# Patient Record
Sex: FEMALE | Race: WHITE | NOT HISPANIC OR LATINO | Employment: FULL TIME | URBAN - METROPOLITAN AREA
[De-identification: names, ages, dates, MRNs, and addresses within clinical notes are randomized per-mention and may not be internally consistent; named-entity substitution may affect disease eponyms.]

---

## 2023-08-29 ENCOUNTER — OFFICE VISIT (OUTPATIENT)
Dept: URGENT CARE | Facility: CLINIC | Age: 58
End: 2023-08-29
Payer: COMMERCIAL

## 2023-08-29 ENCOUNTER — APPOINTMENT (OUTPATIENT)
Dept: RADIOLOGY | Facility: CLINIC | Age: 58
End: 2023-08-29
Payer: COMMERCIAL

## 2023-08-29 VITALS
WEIGHT: 140 LBS | OXYGEN SATURATION: 99 % | RESPIRATION RATE: 16 BRPM | HEIGHT: 65 IN | HEART RATE: 80 BPM | TEMPERATURE: 97.6 F | BODY MASS INDEX: 23.32 KG/M2

## 2023-08-29 DIAGNOSIS — S42.291A HUMERAL HEAD FRACTURE, RIGHT, CLOSED, INITIAL ENCOUNTER: Primary | ICD-10-CM

## 2023-08-29 DIAGNOSIS — M25.511 ACUTE PAIN OF RIGHT SHOULDER: ICD-10-CM

## 2023-08-29 PROCEDURE — 99213 OFFICE O/P EST LOW 20 MIN: CPT | Performed by: PHYSICIAN ASSISTANT

## 2023-08-29 PROCEDURE — 73030 X-RAY EXAM OF SHOULDER: CPT

## 2023-08-29 NOTE — PATIENT INSTRUCTIONS
Patient was placed in a sling for right shoulder humeral head fracture. Patient was told any decrease sensation  or increased pain go to ED. Patient was told to follow up with ortho tomorrow. Shoulder Pain   WHAT YOU NEED TO KNOW:   Shoulder pain is a common problem that can affect your daily activities. Pain can be caused by a problem within your shoulder, such as soreness of a tendon or bursa. A tendon is a cord of tough tissue that connects your muscles to your bones. The bursa is a fluid-filled sac that acts as a cushion between a bone and a tendon. Shoulder pain may also be caused by pain that spreads to your shoulder from another part of your body. DISCHARGE INSTRUCTIONS:   Return to the emergency department if:   You have severe pain. You cannot move your arm or shoulder. You have numbness or tingling in your shoulder or arm. Contact your healthcare provider if:   Your pain gets worse or does not go away with treatment. You have trouble moving your arm or shoulder. You have questions or concerns about your condition or care. Medicines: You may need any of the following:  Acetaminophen  decreases pain and fever. It is available without a doctor's order. Ask how much to take and how often to take it. Follow directions. Read the labels of all other medicines you are using to see if they also contain acetaminophen, or ask your doctor or pharmacist. Acetaminophen can cause liver damage if not taken correctly. NSAIDs , such as ibuprofen, help decrease swelling, pain, and fever. This medicine is available with or without a doctor's order. NSAIDs can cause stomach bleeding or kidney problems in certain people. If you take blood thinner medicine, always ask your healthcare provider if NSAIDs are safe for you. Always read the medicine label and follow directions. Take your medicine as directed.   Contact your healthcare provider if you think your medicine is not helping or if you have side effects. Tell your provider if you are allergic to any medicine. Keep a list of the medicines, vitamins, and herbs you take. Include the amounts, and when and why you take them. Bring the list or the pill bottles to follow-up visits. Carry your medicine list with you in case of an emergency. Manage your symptoms:   Apply ice  on your shoulder for 20 to 30 minutes every 2 hours or as directed. Use an ice pack, or put crushed ice in a plastic bag. Cover it with a towel before you apply it to your shoulder. Ice helps prevent tissue damage and decreases swelling and pain. Apply heat if ice does not help your symptoms. Apply heat on your shoulder for 20 to 30 minutes every 2 hours for as many days as directed. Heat helps decrease pain and muscle spasms. Limit activities as directed. Try to avoid repeated overhead movements. Go to physical or occupational therapy as directed. A physical therapist teaches you exercises to help improve movement and strength, and to decrease pain. An occupational therapist teaches you skills to help with your daily activities. Prevent shoulder pain:   Maintain a good range of motion in your shoulder. Ask your healthcare provider which exercises you should do on a regular basis after you have healed. Stretch and strengthen your shoulder. Use proper technique during exercises and sports. Follow up with your healthcare provider or orthopedist as directed:  Write down your questions so you remember to ask them during your visits. © Copyright [employer] 2022 Information is for End User's use only and may not be sold, redistributed or otherwise used for commercial purposes. The above information is an  only. It is not intended as medical advice for individual conditions or treatments. Talk to your doctor, nurse or pharmacist before following any medical regimen to see if it is safe and effective for you.

## 2023-08-29 NOTE — PROGRESS NOTES
[facility]        NAME: Nan Miranda is a 60 y.o. female  : 1965    MRN: [id number]  DATE: 2023  TIME: 8:34 PM    Assessment and Plan   Humeral head fracture, right, closed, initial encounter [S42.291A]  1. Humeral head fracture, right, closed, initial encounter  XR shoulder 2+ vw right    Ambulatory Referral to Orthopedic Surgery    Comfort Arm Sling      Xray of right humeral head- Fracture or humeral head. Pending radiology report. Patient Instructions       Patient was placed in a sling for right shoulder humeral head fracture. Patient was told any decrease sensation  or increased pain go to ED. Patient was told to follow up with ortho tomorrow. Chief Complaint     Chief Complaint   Patient presents with   • Fall     Pt reports while walking to her car she tripped over her pants resulting in fall with right side impact against concrete. C/o limited rom with right arm. History of Present Illness       Patient is here today with  for right shoulder pain. Patient reports she tripped over pant leg and fell on right shoulder. Patient report limited right shoulder pain. Denies any allergies to medications. Review of Systems   Review of Systems   Constitutional: Negative. Respiratory: Negative. Cardiovascular: Negative. Musculoskeletal:        Right shoulder pain   Psychiatric/Behavioral: Negative. Current Medications     No current outpatient medications on file. Current Allergies     Allergies as of 2023   • (No Known Allergies)            The following portions of the patient's history were reviewed and updated as appropriate: allergies, current medications, past family history, past medical history, past social history, past surgical history and problem list.     History reviewed. No pertinent past medical history. History reviewed. No pertinent surgical history. History reviewed. No pertinent family history.       Medications have been verified. Objective   Pulse 80   Temp 97.6 °F (36.4 °C)   Resp 16   Ht 5' 5" (1.651 m)   Wt 63.5 kg (140 lb)   SpO2 99%   BMI 23.30 kg/m²   No LMP recorded. Physical Exam     Physical Exam  Vitals and nursing note reviewed. Constitutional:       Appearance: She is normal weight. Cardiovascular:      Rate and Rhythm: Normal rate and regular rhythm. Heart sounds: Normal heart sounds. Pulmonary:      Breath sounds: Normal breath sounds. Musculoskeletal:      Comments: Pain over right shoulder joint. No pain over right elbow. Pain over right proximal humerus. Limited movement in right shoulder. Sensation intact in right arm   Neurological:      General: No focal deficit present. Mental Status: She is alert and oriented to person, place, and time.    Psychiatric:         Mood and Affect: Mood normal.         Behavior: Behavior normal.

## 2023-08-30 ENCOUNTER — TELEPHONE (OUTPATIENT)
Dept: URGENT CARE | Facility: CLINIC | Age: 58
End: 2023-08-30

## 2023-08-30 ENCOUNTER — HOSPITAL ENCOUNTER (OUTPATIENT)
Dept: CT IMAGING | Facility: HOSPITAL | Age: 58
Discharge: HOME | End: 2023-08-30
Attending: ORTHOPAEDIC SURGERY
Payer: COMMERCIAL

## 2023-08-30 ENCOUNTER — OFFICE VISIT (OUTPATIENT)
Dept: OBGYN CLINIC | Facility: CLINIC | Age: 58
End: 2023-08-30
Payer: COMMERCIAL

## 2023-08-30 VITALS
DIASTOLIC BLOOD PRESSURE: 86 MMHG | SYSTOLIC BLOOD PRESSURE: 144 MMHG | BODY MASS INDEX: 23.32 KG/M2 | WEIGHT: 140 LBS | HEIGHT: 65 IN

## 2023-08-30 DIAGNOSIS — S42.201A CLOSED FRACTURE OF PROXIMAL END OF RIGHT HUMERUS, INITIAL ENCOUNTER: ICD-10-CM

## 2023-08-30 DIAGNOSIS — S42.291A OTHER CLOSED DISPLACED FRACTURE OF PROXIMAL END OF RIGHT HUMERUS, INITIAL ENCOUNTER: Primary | ICD-10-CM

## 2023-08-30 PROCEDURE — 73200 CT UPPER EXTREMITY W/O DYE: CPT

## 2023-08-30 PROCEDURE — 99203 OFFICE O/P NEW LOW 30 MIN: CPT | Performed by: ORTHOPAEDIC SURGERY

## 2023-08-30 NOTE — PROGRESS NOTES
Assessment:     1. Other closed displaced fracture of proximal end of right humerus, initial encounter        Plan:     Problem List Items Addressed This Visit        Musculoskeletal and Integument    Closed fracture of right proximal humerus - Primary     Findings consistent with right proximal humerus fracture with comminution and mild displacement. Discussed findings and treatment options with the patient. Imaging was reviewed and physical exam was perfomed today. I recommended CT to better evaluate the fracture pattern to determine whether surgical treatment is required. Sling was adjusted for proper fit. Sling can be removed to do elbow movement to avoid stiffness. Discussed it is important to keep moving fingers and wrist. Sleep in a reclining position for most comfort and least amount of pain. Patient will return following imaging. All patient's questions were answered to their satisfaction. This note is created using dictation transcription. It may contain typographical errors, grammatical errors, improperly dictated words, background noise and other errors. Relevant Orders    CT upper extremity wo contrast right       Subjective:     Patient ID: Kamla Concepcion is a 60 y.o. female. Chief Complaint:  25-year-old female injured her right shoulder after tripped on her pants and fell. Patient referred here by Yolanda Zapien PA-C. she landed on her right arm with immediate pain in her shoulder. She denies loss of consciousness. She has difficulty moving her right arm after her injury. Patient was seen in the  [facility]. She was found to have a fracture in her right shoulder and placed in a sling. She denies other injury. Information on patient's intake form was reviewed. Allergy:  No Known Allergies  Medications:  all current active meds have been reviewed  Past Medical History:  History reviewed. No pertinent past medical history.   Past Surgical History:  History reviewed. No pertinent surgical history. Family History:  History reviewed. No pertinent family history. Social History:  Social History     Substance and Sexual Activity   Alcohol Use None     Social History     Substance and Sexual Activity   Drug Use Not on file     Social History     Tobacco Use   Smoking Status Never   Smokeless Tobacco Never     Review of Systems   Constitutional: Negative for chills and fever. HENT: Negative for ear pain and sore throat. Eyes: Negative for pain and visual disturbance. Respiratory: Negative for cough and shortness of breath. Cardiovascular: Negative for chest pain and palpitations. Gastrointestinal: Negative for abdominal pain and vomiting. Genitourinary: Negative for dysuria and hematuria. Musculoskeletal: Positive for arthralgias (Right shoulder) and joint swelling (Right shoulder). Negative for back pain. Skin: Negative for color change and rash. Neurological: Negative for seizures and syncope. Psychiatric/Behavioral: Negative. All other systems reviewed and are negative. Objective:  BP Readings from Last 1 Encounters:   08/30/23 144/86      Wt Readings from Last 1 Encounters:   08/30/23 63.5 kg (140 lb)      BMI:   Estimated body mass index is 23.3 kg/m² as calculated from the following:    Height as of this encounter: 5' 5" (1.651 m). Weight as of this encounter: 63.5 kg (140 lb). BSA:   Estimated body surface area is 1.7 meters squared as calculated from the following:    Height as of this encounter: 5' 5" (1.651 m). Weight as of this encounter: 63.5 kg (140 lb). Physical Exam  Vitals and nursing note reviewed. Constitutional:       Appearance: Normal appearance. She is well-developed. HENT:      Head: Normocephalic and atraumatic. Right Ear: External ear normal.      Left Ear: External ear normal.   Eyes:      Extraocular Movements: Extraocular movements intact.       Conjunctiva/sclera: Conjunctivae normal. Pulmonary:      Effort: Pulmonary effort is normal.   Musculoskeletal:      Cervical back: Neck supple. Skin:     General: Skin is warm and dry. Neurological:      Mental Status: She is alert and oriented to person, place, and time. Deep Tendon Reflexes: Reflexes are normal and symmetric. Psychiatric:         Mood and Affect: Mood normal.         Behavior: Behavior normal.       Right Shoulder Exam     Tenderness   Right shoulder tenderness location: Diffused. Other   Erythema: absent  Sensation: normal  Pulse: present    Comments:  Range of motion and strength not tested due to known fracture            I have personally reviewed pertinent films in PACS and my interpretation is Right shoulder x-rays show proximal humerus fracture at the surgical neck extending into the greater tuberosity. There is slight impaction of the humerus head. Slight displacement. The fracture does not involve the articular surface of the humerus head.

## 2023-08-30 NOTE — ASSESSMENT & PLAN NOTE
Findings consistent with right proximal humerus fracture with comminution and mild displacement. Discussed findings and treatment options with the patient. Imaging was reviewed and physical exam was perfomed today. I recommended CT to better evaluate the fracture pattern to determine whether surgical treatment is required. Sling was adjusted for proper fit. Sling can be removed to do elbow movement to avoid stiffness. Discussed it is important to keep moving fingers and wrist. Sleep in a reclining position for most comfort and least amount of pain. Patient will return following imaging. All patient's questions were answered to their satisfaction. This note is created using dictation transcription. It may contain typographical errors, grammatical errors, improperly dictated words, background noise and other errors.

## 2023-08-31 ENCOUNTER — OFFICE VISIT (OUTPATIENT)
Dept: OBGYN CLINIC | Facility: CLINIC | Age: 58
End: 2023-08-31
Payer: COMMERCIAL

## 2023-08-31 VITALS
SYSTOLIC BLOOD PRESSURE: 144 MMHG | DIASTOLIC BLOOD PRESSURE: 84 MMHG | WEIGHT: 140 LBS | HEIGHT: 65 IN | BODY MASS INDEX: 23.32 KG/M2

## 2023-08-31 DIAGNOSIS — S42.291A OTHER CLOSED DISPLACED FRACTURE OF PROXIMAL END OF RIGHT HUMERUS, INITIAL ENCOUNTER: Primary | ICD-10-CM

## 2023-08-31 PROCEDURE — 99214 OFFICE O/P EST MOD 30 MIN: CPT | Performed by: ORTHOPAEDIC SURGERY

## 2023-08-31 PROCEDURE — 23600 CLTX PROX HUMRL FX W/O MNPJ: CPT | Performed by: ORTHOPAEDIC SURGERY

## 2023-08-31 NOTE — PROGRESS NOTES
Assessment:     1. Other closed displaced fracture of proximal end of right humerus, initial encounter        Plan:     Problem List Items Addressed This Visit        Musculoskeletal and Integument    Closed fracture of right proximal humerus - Primary     Findings consistent with right proximal humerus comminuted fracture with mild displacement. Discussed findings and treatment options with the patient. I reviewed patient's right shoulder CT scan with her and her . I explained to them that the fracture pattern appear to be well aligned up with slight displacement of the greater tuberosity by about 5 mm. The alignment of the fracture appear to be in acceptable position. We should be able to treat her fracture without surgical interventions as long as the fracture does not displace further. I have instructed patient how to position her arm and using the sling. I also advised her to avoid activities that could potentially displace the fracture further. I will see patient back in 3 weeks with a repeat x-ray of the shoulder. Continue cold compress. All patient's questions were answered to their satisfaction. This note is created using dictation transcription. It may contain typographical errors, grammatical errors, improperly dictated words, background noise and other errors. Relevant Orders    Fracture / Dislocation Treatment       Subjective:     Patient ID: Jada Anders is a 60 y.o. female. Chief Complaint:  62-year-old female follow-up right shoulder injury on 8/29/2023. Patient is here to review her right shoulder CT scan. Her pain is under good control. She has been using the sling. Allergy:  No Known Allergies  Medications:  all current active meds have been reviewed  Past Medical History:  History reviewed. No pertinent past medical history. Past Surgical History:  History reviewed. No pertinent surgical history. Family History:  History reviewed.  No pertinent family history. Social History:  Social History     Substance and Sexual Activity   Alcohol Use None     Social History     Substance and Sexual Activity   Drug Use Not on file     Social History     Tobacco Use   Smoking Status Never   Smokeless Tobacco Never     Review of Systems   Constitutional: Negative for chills and fever. HENT: Negative for ear pain and sore throat. Eyes: Negative for pain and visual disturbance. Respiratory: Negative for cough and shortness of breath. Cardiovascular: Negative for chest pain and palpitations. Gastrointestinal: Negative for abdominal pain and vomiting. Genitourinary: Negative for dysuria and hematuria. Musculoskeletal: Positive for arthralgias (Right shoulder) and joint swelling (Right shoulder). Negative for back pain. Skin: Negative for color change and rash. Neurological: Negative for seizures and syncope. Psychiatric/Behavioral: Negative. All other systems reviewed and are negative. Objective:  BP Readings from Last 1 Encounters:   08/31/23 144/84      Wt Readings from Last 1 Encounters:   08/31/23 63.5 kg (140 lb)      BMI:   Estimated body mass index is 23.3 kg/m² as calculated from the following:    Height as of this encounter: 5' 5" (1.651 m). Weight as of this encounter: 63.5 kg (140 lb). BSA:   Estimated body surface area is 1.7 meters squared as calculated from the following:    Height as of this encounter: 5' 5" (1.651 m). Weight as of this encounter: 63.5 kg (140 lb). Physical Exam  Vitals and nursing note reviewed. Constitutional:       Appearance: Normal appearance. She is well-developed. HENT:      Head: Normocephalic and atraumatic. Right Ear: External ear normal.      Left Ear: External ear normal.   Eyes:      Extraocular Movements: Extraocular movements intact. Conjunctiva/sclera: Conjunctivae normal.   Pulmonary:      Effort: Pulmonary effort is normal.   Musculoskeletal:      Cervical back: Neck supple. Skin:     General: Skin is warm and dry. Neurological:      Mental Status: She is alert and oriented to person, place, and time. Deep Tendon Reflexes: Reflexes are normal and symmetric. Psychiatric:         Mood and Affect: Mood normal.         Behavior: Behavior normal.       Right Shoulder Exam     Tenderness   Right shoulder tenderness location: Diffused. Other   Erythema: absent  Sensation: normal  Pulse: present    Comments:  Range of motion and strength not tested due to known fracture            I have personally reviewed pertinent films in PACS and my interpretation is Right shoulder x-rays show proximal humerus fracture at the surgical neck extending into the greater tuberosity. There is slight impaction of the humerus head. Slight displacement. The fracture does not involve the articular surface of the humerus head. Fracture / Dislocation Treatment    Date/Time: 8/31/2023 10:15 AM    Performed by: Lorene Robert MD  Authorized by: Lorene Robert MD    Patient Location:  [facility] Protocol:  Consent: Verbal consent obtained.   Risks and benefits: risks, benefits and alternatives were discussed  Consent given by: patient and spouse  Patient understanding: patient states understanding of the procedure being performed      Injury location:  Shoulder  Location details:  Right shoulder  Injury type:  Fracture  Fracture type: surgical neck    Neurovascular status: Neurovascularly intact    Distal perfusion: normal    Neurological function: normal    Range of motion: reduced    Local anesthesia used?: No    General anesthesia used?: No    Manipulation performed?: No    Immobilization:  Sling  Neurovascular status: Neurovascularly intact    Distal perfusion: normal    Neurological function: normal    Range of motion: unchanged    Patient tolerance:  Patient tolerated the procedure well with no immediate complications

## 2023-08-31 NOTE — ASSESSMENT & PLAN NOTE
Findings consistent with right proximal humerus comminuted fracture with mild displacement. Discussed findings and treatment options with the patient. I reviewed patient's right shoulder CT scan with her and her . I explained to them that the fracture pattern appear to be well aligned up with slight displacement of the greater tuberosity by about 5 mm. The alignment of the fracture appear to be in acceptable position. We should be able to treat her fracture without surgical interventions as long as the fracture does not displace further. I have instructed patient how to position her arm and using the sling. I also advised her to avoid activities that could potentially displace the fracture further. I will see patient back in 3 weeks with a repeat x-ray of the shoulder. Continue cold compress. All patient's questions were answered to their satisfaction. This note is created using dictation transcription. It may contain typographical errors, grammatical errors, improperly dictated words, background noise and other errors.

## 2023-09-21 ENCOUNTER — APPOINTMENT (OUTPATIENT)
Dept: RADIOLOGY | Facility: CLINIC | Age: 58
End: 2023-09-21
Payer: COMMERCIAL

## 2023-09-21 ENCOUNTER — OFFICE VISIT (OUTPATIENT)
Dept: OBGYN CLINIC | Facility: CLINIC | Age: 58
End: 2023-09-21

## 2023-09-21 VITALS
HEIGHT: 65 IN | WEIGHT: 140 LBS | SYSTOLIC BLOOD PRESSURE: 140 MMHG | DIASTOLIC BLOOD PRESSURE: 82 MMHG | BODY MASS INDEX: 23.32 KG/M2

## 2023-09-21 DIAGNOSIS — S42.291D OTHER CLOSED DISPLACED FRACTURE OF PROXIMAL END OF RIGHT HUMERUS WITH ROUTINE HEALING, SUBSEQUENT ENCOUNTER: ICD-10-CM

## 2023-09-21 DIAGNOSIS — S42.291D OTHER CLOSED DISPLACED FRACTURE OF PROXIMAL END OF RIGHT HUMERUS WITH ROUTINE HEALING, SUBSEQUENT ENCOUNTER: Primary | ICD-10-CM

## 2023-09-21 PROCEDURE — 73030 X-RAY EXAM OF SHOULDER: CPT

## 2023-09-21 PROCEDURE — 99024 POSTOP FOLLOW-UP VISIT: CPT | Performed by: ORTHOPAEDIC SURGERY

## 2023-09-21 NOTE — ASSESSMENT & PLAN NOTE
Findings consistent with right proximal humerus comminuted fracture with mild displacement, DOI 8/29/23. Findings and treatment options were discussed with the patient and her . X-rays were reviewed with them today that revealed stable fractures with no further displacement. She may come out of the sling for pendulum swings at this time. No lifting, pushing or pulling with that arm. No active range of motion. Continue sling for comfort. Follow-up in 3 weeks with  repeat x-rays of the right shoulder. She will most likely start formal physical therapy at that time. All patient's questions were answered to their satisfaction. This note is created using dictation transcription. It may contain typographical errors, grammatical errors, improperly dictated words, background noise and other errors.

## 2023-09-21 NOTE — PROGRESS NOTES
Assessment:     1. Other closed displaced fracture of proximal end of right humerus with routine healing, subsequent encounter        Plan:     Problem List Items Addressed This Visit        Musculoskeletal and Integument    Closed fracture of right proximal humerus - Primary     Findings consistent with right proximal humerus comminuted fracture with mild displacement, DOI 8/29/23. Findings and treatment options were discussed with the patient and her . X-rays were reviewed with them today that revealed stable fractures with no further displacement. She may come out of the sling for pendulum swings at this time. No lifting, pushing or pulling with that arm. No active range of motion. Continue sling for comfort. Follow-up in 3 weeks with  repeat x-rays of the right shoulder. She will most likely start formal physical therapy at that time. All patient's questions were answered to their satisfaction. This note is created using dictation transcription. It may contain typographical errors, grammatical errors, improperly dictated words, background noise and other errors. Relevant Orders    XR shoulder 2+ vw right       Subjective:     Patient ID: Jose Acharya is a 60 y.o. female. Chief Complaint:  79-year-old female accompanied by her  following up for a right proximal humerus fracture. Date of injury on 8/29/2023. She has been using the sling as directed. She denies any pain in her shoulder. Allergy:  No Known Allergies  Medications:  all current active meds have been reviewed  Past Medical History:  History reviewed. No pertinent past medical history. Past Surgical History:  History reviewed. No pertinent surgical history. Family History:  History reviewed. No pertinent family history.   Social History:  Social History     Substance and Sexual Activity   Alcohol Use None     Social History     Substance and Sexual Activity   Drug Use Not on file     Social History     Tobacco Use Smoking Status Never   Smokeless Tobacco Never     Review of Systems   Constitutional: Negative for chills and fever. HENT: Negative for ear pain and sore throat. Eyes: Negative for pain and visual disturbance. Respiratory: Negative for cough and shortness of breath. Cardiovascular: Negative for chest pain and palpitations. Gastrointestinal: Negative for abdominal pain and vomiting. Genitourinary: Negative for dysuria and hematuria. Musculoskeletal: Positive for joint swelling (Right shoulder). Negative for arthralgias (Right shoulder) and back pain. Skin: Negative for color change and rash. Neurological: Negative for seizures and syncope. Psychiatric/Behavioral: Negative. All other systems reviewed and are negative. Objective:  BP Readings from Last 1 Encounters:   09/21/23 140/82      Wt Readings from Last 1 Encounters:   09/21/23 63.5 kg (140 lb)      BMI:   Estimated body mass index is 23.3 kg/m² as calculated from the following:    Height as of this encounter: 5' 5" (1.651 m). Weight as of this encounter: 63.5 kg (140 lb). BSA:   Estimated body surface area is 1.7 meters squared as calculated from the following:    Height as of this encounter: 5' 5" (1.651 m). Weight as of this encounter: 63.5 kg (140 lb). Physical Exam  Vitals and nursing note reviewed. Constitutional:       Appearance: Normal appearance. She is well-developed. HENT:      Head: Normocephalic and atraumatic. Right Ear: External ear normal.      Left Ear: External ear normal.   Eyes:      Extraocular Movements: Extraocular movements intact. Conjunctiva/sclera: Conjunctivae normal.   Pulmonary:      Effort: Pulmonary effort is normal.   Musculoskeletal:      Cervical back: Neck supple. Skin:     General: Skin is warm and dry. Neurological:      Mental Status: She is alert and oriented to person, place, and time. Deep Tendon Reflexes: Reflexes are normal and symmetric. Psychiatric:         Mood and Affect: Mood normal.         Behavior: Behavior normal.       Right Shoulder Exam     Tenderness   Right shoulder tenderness location: diffuse. Other   Erythema: absent  Scars: absent  Sensation: normal  Pulse: present    Comments:  Range of motion and strength not tested due to known fracture            I have personally reviewed pertinent films in PACS and my interpretation is X-rays of the right shoulder reveal stable humeral neck and greater tuberosity fractures with no further displacement.      Scribe Attestation    I,:  Radha Gamez PA-C am acting as a scribe while in the presence of the attending physician.:       I,:  Nguyen Forte MD personally performed the services described in this documentation    as scribed in my presence.:

## 2023-10-12 ENCOUNTER — APPOINTMENT (OUTPATIENT)
Dept: RADIOLOGY | Facility: CLINIC | Age: 58
End: 2023-10-12
Payer: COMMERCIAL

## 2023-10-12 ENCOUNTER — OFFICE VISIT (OUTPATIENT)
Dept: OBGYN CLINIC | Facility: CLINIC | Age: 58
End: 2023-10-12

## 2023-10-12 VITALS
WEIGHT: 140 LBS | SYSTOLIC BLOOD PRESSURE: 140 MMHG | DIASTOLIC BLOOD PRESSURE: 80 MMHG | HEIGHT: 65 IN | BODY MASS INDEX: 23.32 KG/M2

## 2023-10-12 DIAGNOSIS — S42.291D OTHER CLOSED DISPLACED FRACTURE OF PROXIMAL END OF RIGHT HUMERUS WITH ROUTINE HEALING, SUBSEQUENT ENCOUNTER: ICD-10-CM

## 2023-10-12 DIAGNOSIS — S42.291D OTHER CLOSED DISPLACED FRACTURE OF PROXIMAL END OF RIGHT HUMERUS WITH ROUTINE HEALING, SUBSEQUENT ENCOUNTER: Primary | ICD-10-CM

## 2023-10-12 PROCEDURE — 99024 POSTOP FOLLOW-UP VISIT: CPT | Performed by: ORTHOPAEDIC SURGERY

## 2023-10-12 PROCEDURE — 73030 X-RAY EXAM OF SHOULDER: CPT

## 2023-10-12 NOTE — PROGRESS NOTES
Assessment:     1. Other closed displaced fracture of proximal end of right humerus with routine healing, subsequent encounter        Plan:     Problem List Items Addressed This Visit          Musculoskeletal and Integument    Closed fracture of right proximal humerus - Primary     Findings consistent with right proximal humerus comminuted fracture with mild displacement, DOI 8/29/23. Findings and treatment options were discussed with the patient and her . X-rays were reviewed with them today that revealed stable fractures with evidence of healing. She will start formal physical therapy at this time. Continue to avoid any heavy lifting, pushing or pulling. Follow-up in 6 weeks with repeat x-rays of the right shoulder. All patient's questions were answered to their satisfaction. This note is created using dictation transcription. It may contain typographical errors, grammatical errors, improperly dictated words, background noise and other errors. Relevant Orders    XR shoulder 2+ vw right    Ambulatory Referral to Physical Therapy       Subjective:     Patient ID: Nan Miranda is a 60 y.o. female. Chief Complaint:  54-year-old female accompanied by her  following up for a right proximal humerus fracture. Date of injury on 8/29/2023. She is doing well. She denies any pain in her shoulder today. She feels tightness and weakness in her right shoulder when trying to lift. Allergy:  No Known Allergies  Medications:  all current active meds have been reviewed  Past Medical History:  History reviewed. No pertinent past medical history. Past Surgical History:  History reviewed. No pertinent surgical history. Family History:  History reviewed. No pertinent family history.   Social History:  Social History     Substance and Sexual Activity   Alcohol Use None     Social History     Substance and Sexual Activity   Drug Use Not on file     Social History     Tobacco Use   Smoking Status Never   Smokeless Tobacco Never     Review of Systems   Constitutional:  Negative for chills and fever. HENT:  Negative for ear pain and sore throat. Eyes:  Negative for pain and visual disturbance. Respiratory:  Negative for cough and shortness of breath. Cardiovascular:  Negative for chest pain and palpitations. Gastrointestinal:  Negative for abdominal pain and vomiting. Genitourinary:  Negative for dysuria and hematuria. Musculoskeletal:  Negative for arthralgias (Right shoulder), back pain and joint swelling (Right shoulder). Skin:  Negative for color change and rash. Neurological:  Negative for seizures and syncope. Psychiatric/Behavioral: Negative. All other systems reviewed and are negative. Objective:  BP Readings from Last 1 Encounters:   10/12/23 140/80      Wt Readings from Last 1 Encounters:   10/12/23 63.5 kg (140 lb)      BMI:   Estimated body mass index is 23.3 kg/m² as calculated from the following:    Height as of this encounter: 5' 5" (1.651 m). Weight as of this encounter: 63.5 kg (140 lb). BSA:   Estimated body surface area is 1.7 meters squared as calculated from the following:    Height as of this encounter: 5' 5" (1.651 m). Weight as of this encounter: 63.5 kg (140 lb). Physical Exam  Vitals and nursing note reviewed. Constitutional:       Appearance: Normal appearance. She is well-developed. HENT:      Head: Normocephalic and atraumatic. Right Ear: External ear normal.      Left Ear: External ear normal.   Eyes:      Extraocular Movements: Extraocular movements intact. Conjunctiva/sclera: Conjunctivae normal.   Pulmonary:      Effort: Pulmonary effort is normal.   Musculoskeletal:      Cervical back: Neck supple. Skin:     General: Skin is warm and dry. Neurological:      Mental Status: She is alert and oriented to person, place, and time. Deep Tendon Reflexes: Reflexes are normal and symmetric.    Psychiatric:         Mood and Affect: Mood normal.         Behavior: Behavior normal.       Right Shoulder Exam     Tenderness   The patient is experiencing no tenderness. Range of Motion   Active abduction:  60   Forward flexion:  90     Other   Erythema: absent  Scars: absent  Sensation: normal  Pulse: present    Comments:  Strength not tested due to known fracture            I have personally reviewed pertinent films in PACS and my interpretation is X-rays of the right shoulder reveal stable humeral neck and greater tuberosity fractures with no further displacement. There is evidence of bone callous formation.      Scribe Attestation      I,:  Tomas Yarbrough PA-C am acting as a scribe while in the presence of the attending physician.:       I,:  Júnior Brambila MD personally performed the services described in this documentation    as scribed in my presence.:

## 2023-10-18 ENCOUNTER — EVALUATION (OUTPATIENT)
Dept: PHYSICAL THERAPY | Facility: CLINIC | Age: 58
End: 2023-10-18
Payer: COMMERCIAL

## 2023-10-18 DIAGNOSIS — M62.81 MUSCLE WEAKNESS OF RIGHT UPPER EXTREMITY: ICD-10-CM

## 2023-10-18 DIAGNOSIS — M25.511 CHRONIC RIGHT SHOULDER PAIN: ICD-10-CM

## 2023-10-18 DIAGNOSIS — S42.291D OTHER CLOSED DISPLACED FRACTURE OF PROXIMAL END OF RIGHT HUMERUS WITH ROUTINE HEALING, SUBSEQUENT ENCOUNTER: Primary | ICD-10-CM

## 2023-10-18 DIAGNOSIS — G89.29 CHRONIC RIGHT SHOULDER PAIN: ICD-10-CM

## 2023-10-18 PROCEDURE — 97110 THERAPEUTIC EXERCISES: CPT | Performed by: PHYSICAL THERAPIST

## 2023-10-18 PROCEDURE — 97161 PT EVAL LOW COMPLEX 20 MIN: CPT | Performed by: PHYSICAL THERAPIST

## 2023-10-18 NOTE — PROGRESS NOTES
PT Evaluation     Today's date: 10/18/2023  Patient name: Romel Sales  : 1965  MRN: [id number]  Referring provider: Edna Jones PA-C  Dx:   Encounter Diagnosis     ICD-10-CM    1. Other closed displaced fracture of proximal end of right humerus with routine healing, subsequent encounter  S42.291D Ambulatory Referral to Physical Therapy      2. Chronic right shoulder pain  M25.511     G89.29       3. Muscle weakness of right upper extremity  M62.81                      Assessment  Assessment details: Patient is a 60 y.o. female with PT prescription following proximal humerus fracture. Patient presents with decreased shoulder ROM, decreased shoulder girdle strength, and decreased upper extremity flexibility. These impairments limit patient with sleeping, dressing, combing hair, performing work duties, reaching overhead, lifting, driving, and holding grandchildren. To address impairments and improve function, patient would benefit from skilled PT consisting of manual therapy to improve ROM and joint mobility, therapeutic exercises to improve UE strength and flexibility, neuromuscular reeducation to improve shoulder stabilization, and patient education on home program.    Impairments: abnormal or restricted ROM, impaired physical strength, lacks appropriate home exercise program, pain with function, scapular dyskinesis and weight-bearing intolerance    Symptom irritability: moderateUnderstanding of Dx/Px/POC: good   Prognosis: good    Goals  Short term goals:  Patient is independent in home program to support plan of care and improve function. - 2 weeks  Patient demonstrates 140 degrees shoulder flexion PROM to reduce difficulty with reaching into top shelf. - 2 weeks  Patient demonstrates at least 80 degrees shoulder IR PROM so she can get dressed with less pain. - 3 weeks    Long term goals:  Patient demonstrates improvement in community participation with increase in FOTO score by 20%.  - 8 weeks  Patient demonstrates at least 150 degrees shoulder flexion AROM for less difficulty reaching overhead and completing ADLs. - 8 weeks  Patient demonstrates at least 4+/5 shoulder strength so she can perform all work duties without difficulty and lift grandchildren. - 8 weeks  Patient can return to driving without shoulder pain. - 4 weeks          Plan  Patient would benefit from: skilled physical therapy  Planned modality interventions: cryotherapy  Planned therapy interventions: activity modification, ADL training, body mechanics training, flexibility, functional ROM exercises, home exercise program, therapeutic exercise, therapeutic activities, stretching, strengthening, patient education, neuromuscular re-education, massage, manual therapy and joint mobilization  Frequency: 2x week  Duration in weeks: 8  Plan of Care beginning date: 10/18/2023  Plan of Care expiration date: 12/15/2023  Treatment plan discussed with: patient    Subjective Evaluation    History of Present Illness  Date of onset: 8/29/2023  Mechanism of injury: Patient has PT prescription following proximal humerus fracture on 8/29/23 after tripping over her pants/sandal and landing on her elbow. Patient had imaging done, and was in a sling for approximately 1 month. Patient saw orthopedist last week and was referred to therapy and told she could return to work, but no lifting yet. She has been doing pendulum exercises at home. She was told she can gradually return to normal activities, and driving when deemed ready by PT. Symptoms: upper arm pain, forearm and wrist pain. Patient is limited with reaching overhead, notices weakness of UE, has difficulty combing hair, dressing, She is unable to drive yet, and has soreness after work day, not performing full work duties yet. Most soreness in the morning. Occasionally paraesthesias, notices some coldness in her finger tips. Some sleep disturbance from shoulder discomfort.      Employment: Patient works at [employer], packaging and receiving, placing orders. Occasionally has to lift packages, up to 10lbs. Patient Goals  Patient goals for therapy: decreased pain and increased motion  Patient goal: gardening, being able to hold 1 yo grandaughter, do hair without difficulty  Pain  Current pain ratin  At best pain ratin  At worst pain ratin        Objective     Active Range of Motion   Left Shoulder   Flexion: 175 degrees   Abduction: WFL  External rotation BTH: T4   Internal rotation BTB: T5     Right Shoulder   Flexion: 90 degrees with pain  Abduction: 80 degrees with pain  External rotation BTH: C3 with pain  Internal rotation BTB: L3 with pain    Right Elbow   Elbow hyperextension  Flexion: WFL  Extension: WFL    Passive Range of Motion     Right Shoulder   Flexion: 110 degrees with pain  Abduction: 90 degrees with pain  Adduction: 20 degrees   External rotation 45°: 30 degrees with pain  Internal rotation 45°: 76 degrees with pain    Right Elbow   Flexion: WFL  Extension: WFL  Forearm supination: 80 degrees   Forearm pronation: WFL    Right Wrist   Wrist flexion: 80 degrees   Wrist extension: 80 degrees   Radial deviation: 30 degrees   Ulnar deviation: 45 degrees     Strength/Myotome Testing     Left Shoulder     Planes of Motion   Flexion: 5   Abduction: 5   External rotation at 0°: 5   Internal rotation at 0°: 5     Isolated Muscles   Biceps: 5   Triceps: 5     Right Shoulder     Planes of Motion   Flexion: 4-   Abduction: 3+   External rotation at 0°: 3+   Internal rotation at 45°: 3+     Isolated Muscles   Biceps: 4-   Triceps: 3+     Additional Strength Details  R strength testing done submaximally to avoid aggravation             Precautions: AROM, PROM, gradual return to strengthening.  EPOC 12/15/23    Manuals 10/18            Shld PROM with jt stabilization                          STM UT/infraspinatus                          Neuro Re-Ed                          Scap retraction Ther Ex             Table slide flexion 10x5"            Wand flexion AAROM             Pt edu on plan of care, pathology, home program 5'            Wand ER AROM 10x5"            IR PROM behind back                                       Pulley flexion             Cross body adduction stretch 10x5"                                                                Ther Activity                                       Gait Training                                       Modalities             Cold pack end 8'

## 2023-10-23 ENCOUNTER — OFFICE VISIT (OUTPATIENT)
Dept: PHYSICAL THERAPY | Facility: CLINIC | Age: 58
End: 2023-10-23
Payer: COMMERCIAL

## 2023-10-23 DIAGNOSIS — M62.81 MUSCLE WEAKNESS OF RIGHT UPPER EXTREMITY: ICD-10-CM

## 2023-10-23 DIAGNOSIS — G89.29 CHRONIC RIGHT SHOULDER PAIN: ICD-10-CM

## 2023-10-23 DIAGNOSIS — S42.291D OTHER CLOSED DISPLACED FRACTURE OF PROXIMAL END OF RIGHT HUMERUS WITH ROUTINE HEALING, SUBSEQUENT ENCOUNTER: Primary | ICD-10-CM

## 2023-10-23 DIAGNOSIS — M25.511 CHRONIC RIGHT SHOULDER PAIN: ICD-10-CM

## 2023-10-23 PROCEDURE — 97010 HOT OR COLD PACKS THERAPY: CPT | Performed by: PHYSICAL THERAPIST

## 2023-10-23 PROCEDURE — 97110 THERAPEUTIC EXERCISES: CPT | Performed by: PHYSICAL THERAPIST

## 2023-10-23 PROCEDURE — 97140 MANUAL THERAPY 1/> REGIONS: CPT | Performed by: PHYSICAL THERAPIST

## 2023-10-23 NOTE — PROGRESS NOTES
Daily Note     Today's date: 10/23/2023  Patient name: Apolonia Dodd  : 1965  MRN: [id number]  Referring provider: Twila Paredes PA-C  Dx:   Encounter Diagnosis     ICD-10-CM    1. Other closed displaced fracture of proximal end of right humerus with routine healing, subsequent encounter  S42.291D       2. Chronic right shoulder pain  M25.511     G89.29       3. Muscle weakness of right upper extremity  M62.81                      Subjective: patient reports some soreness following evaluation but overall the exercises feel good. Objective: See treatment diary below      Assessment: Tolerated treatment well. Began session with pendulums to improve shoulder mobility and act as warm up. Patient tolerated addition ROM exercises well consisting of pulleys, wand flexion, and table slides. She presented with increased soft tissue restrictions and trigger points along upper trap and supraspinatus. Patient would benefit from continued skilled PT per plan of care to address impairments and improve function. Plan: Continue per plan of care. Precautions: AROM, PROM, gradual return to strengthening.  EPOC 12/15/23    Manuals 10/18 10/23           Shld PROM with jt stabilization  NICOLE                        STM UT/infraspinatus  NICOLE                        Neuro Re-Ed                          Scap retraction  10x           Median nerve flossing  nv                                     Ther Ex             Table slide flexion 10x5" 10x5"           Table slide abd  10x5"                        Wand flexion AAROM  10x5"           Pt edu on plan of care, pathology, home program 5'            Wand ER AROM 10x5" 10x5"           IR PROM behind back             Pendulums all directions  10 ea           R UT stretch  5x10"           Pulley flexion  10x5"           Cross body adduction stretch 10x5" 10x5"                                                               Ther Activity                                       Gait Training                                       Modalities             Cold pack end 8' 8'

## 2023-10-25 ENCOUNTER — OFFICE VISIT (OUTPATIENT)
Dept: PHYSICAL THERAPY | Facility: CLINIC | Age: 58
End: 2023-10-25
Payer: COMMERCIAL

## 2023-10-25 DIAGNOSIS — G89.29 CHRONIC RIGHT SHOULDER PAIN: ICD-10-CM

## 2023-10-25 DIAGNOSIS — M62.81 MUSCLE WEAKNESS OF RIGHT UPPER EXTREMITY: ICD-10-CM

## 2023-10-25 DIAGNOSIS — S42.291D OTHER CLOSED DISPLACED FRACTURE OF PROXIMAL END OF RIGHT HUMERUS WITH ROUTINE HEALING, SUBSEQUENT ENCOUNTER: Primary | ICD-10-CM

## 2023-10-25 DIAGNOSIS — M25.511 CHRONIC RIGHT SHOULDER PAIN: ICD-10-CM

## 2023-10-25 PROCEDURE — 97010 HOT OR COLD PACKS THERAPY: CPT | Performed by: PHYSICAL THERAPIST

## 2023-10-25 PROCEDURE — 97140 MANUAL THERAPY 1/> REGIONS: CPT | Performed by: PHYSICAL THERAPIST

## 2023-10-25 PROCEDURE — 97112 NEUROMUSCULAR REEDUCATION: CPT | Performed by: PHYSICAL THERAPIST

## 2023-10-25 PROCEDURE — 97110 THERAPEUTIC EXERCISES: CPT | Performed by: PHYSICAL THERAPIST

## 2023-10-25 NOTE — PROGRESS NOTES
Daily Note     Today's date: 10/25/2023  Patient name: Malik Lind  : 1965  MRN: [id number]  Referring provider: Mathew Amaro PA-C  Dx:   Encounter Diagnosis     ICD-10-CM    1. Other closed displaced fracture of proximal end of right humerus with routine healing, subsequent encounter  S42.291D       2. Chronic right shoulder pain  M25.511     G89.29       3. Muscle weakness of right upper extremity  M62.81                      Subjective: patient continues to have some soreness following PT session and exercises, but overall feeling better. She was able to put her hair in pony tail recently which is significant improvement. She is wondering when she can return to driving. Her physician said PT can clear her. Objective: See treatment diary below      Assessment: Tolerated treatment well. Progressed program with wall circles and UBE to help simulate driving motions and return to this activity. Patient demonstrated adequate ROM for using steering wheel. Recommended patient trial short bout of driving this weekend in parking lot to assess for readiness for return to driving on road. Patient would benefit from continued skilled PT per plan of care to address impairments and improve function. Plan: Continue per plan of care. Precautions: AROM, PROM, gradual return to strengthening.  EPOC 12/15/23    Manuals 10/18 10/23 10/25          Shld PROM with jt stabilization  NICOLE NICOLE                       STM UT/infraspinatus  NICOLE NICOLE                       Neuro Re-Ed                          Scap retraction  10x 10x          Median nerve flossing  nv 10x          Median nerve stretch   10x                       Ther Ex             Table slide flexion 10x5" 10x5"           Table slide abd  10x5"           Wall circles   10 ea          Wand flexion AAROM  10x5" 10x5"          Pt edu on plan of care, pathology, home program 5'  3'          Wand ER AROM 10x5" 10x5" 10x5"          IR PROM behind back   10x5" Pendulums all directions  10 ea 10 ea          R UT stretch  5x10" 5x10"          Pulley flexion  10x5" 10x5"          Pulley abd   10x5"          Cross body adduction stretch 10x5" 10x5" 10x5"          UBE for ROM/strength   2'/2' lvl 1                                                 Ther Activity                                       Gait Training                                       Modalities             Cold pack end 8' 8' 5'

## 2023-10-30 ENCOUNTER — OFFICE VISIT (OUTPATIENT)
Dept: PHYSICAL THERAPY | Facility: CLINIC | Age: 58
End: 2023-10-30
Payer: COMMERCIAL

## 2023-10-30 DIAGNOSIS — G89.29 CHRONIC RIGHT SHOULDER PAIN: ICD-10-CM

## 2023-10-30 DIAGNOSIS — S42.291D OTHER CLOSED DISPLACED FRACTURE OF PROXIMAL END OF RIGHT HUMERUS WITH ROUTINE HEALING, SUBSEQUENT ENCOUNTER: Primary | ICD-10-CM

## 2023-10-30 DIAGNOSIS — M62.81 MUSCLE WEAKNESS OF RIGHT UPPER EXTREMITY: ICD-10-CM

## 2023-10-30 DIAGNOSIS — M25.511 CHRONIC RIGHT SHOULDER PAIN: ICD-10-CM

## 2023-10-30 PROCEDURE — 97010 HOT OR COLD PACKS THERAPY: CPT | Performed by: PHYSICAL THERAPIST

## 2023-10-30 PROCEDURE — 97110 THERAPEUTIC EXERCISES: CPT | Performed by: PHYSICAL THERAPIST

## 2023-10-30 PROCEDURE — 97112 NEUROMUSCULAR REEDUCATION: CPT | Performed by: PHYSICAL THERAPIST

## 2023-10-30 PROCEDURE — 97140 MANUAL THERAPY 1/> REGIONS: CPT | Performed by: PHYSICAL THERAPIST

## 2023-11-01 ENCOUNTER — OFFICE VISIT (OUTPATIENT)
Dept: PHYSICAL THERAPY | Facility: CLINIC | Age: 58
End: 2023-11-01
Payer: COMMERCIAL

## 2023-11-01 DIAGNOSIS — G89.29 CHRONIC RIGHT SHOULDER PAIN: ICD-10-CM

## 2023-11-01 DIAGNOSIS — M25.511 CHRONIC RIGHT SHOULDER PAIN: ICD-10-CM

## 2023-11-01 DIAGNOSIS — M62.81 MUSCLE WEAKNESS OF RIGHT UPPER EXTREMITY: ICD-10-CM

## 2023-11-01 DIAGNOSIS — S42.291D OTHER CLOSED DISPLACED FRACTURE OF PROXIMAL END OF RIGHT HUMERUS WITH ROUTINE HEALING, SUBSEQUENT ENCOUNTER: Primary | ICD-10-CM

## 2023-11-01 PROCEDURE — 97112 NEUROMUSCULAR REEDUCATION: CPT | Performed by: PHYSICAL THERAPIST

## 2023-11-01 PROCEDURE — 97010 HOT OR COLD PACKS THERAPY: CPT | Performed by: PHYSICAL THERAPIST

## 2023-11-01 PROCEDURE — 97110 THERAPEUTIC EXERCISES: CPT | Performed by: PHYSICAL THERAPIST

## 2023-11-01 PROCEDURE — 97140 MANUAL THERAPY 1/> REGIONS: CPT | Performed by: PHYSICAL THERAPIST

## 2023-11-01 NOTE — PROGRESS NOTES
Daily Note     Today's date: 2023  Patient name: Abbey Olszewski  : 1965  MRN: [id number]  Referring provider: Lena Hernandez PA-C  Dx:   Encounter Diagnosis     ICD-10-CM    1. Other closed displaced fracture of proximal end of right humerus with routine healing, subsequent encounter  S42.291D       2. Chronic right shoulder pain  M25.511     G89.29       3. Muscle weakness of right upper extremity  M62.81                      Subjective: patient reports that she drove to work without issue or complaint yesterday. She notes continued improvements in shoulder function, especially with reaching behind her head. Objective: See treatment diary below      Assessment: Tolerated treatment well. Progressed mobility exercises with doorway ER/pec stretch. Patient required less cuing for technique with scapular stabilization exercises (rows and shoulder extension). Patient would benefit from continued skilled PT per plan of care to address impairments and improve function. Plan: Continue per plan of care. Progress mobility and strength as tolerated. Precautions: AROM, PROM, gradual return to strengthening.  EPOC 12/15/23    Manuals 10/18 10/23 10/25 10/30 11/1        Shld PROM with jt stabilization  NICOLE NICOLE NICOLE NICOLE                     STM UT/infraspinatus  NICOLE NICOLE                       Neuro Re-Ed                          Scap retraction  10x 10x          Median nerve flossing  nv 10x 15x 15x        Median nerve stretch   10x 15x 10x        Shld ext with retraction    15x gtb 2x10 gtb        Row with retraction    15x bltb 2x10 bltb        Ther Ex             Table slide flexion 10x5" 10x5"   Wall slide 10x        Table slide abd  10x5"           Wall circles   10 ea 10 ea 10 ea 1#        Wand flexion AAROM  10x5" 10x5" 10x 10x        Pt edu on plan of care, pathology, home program 5'  3'          Wand ER AROM 10x5" 10x5" 10x5" 10x10" 10x        IR PROM behind back   10x5" 5x10" 5x10"        Pendulums all directions  10 ea 10 ea          R UT stretch  5x10" 5x10"          Pulley flexion  10x5" 10x5" 10x5" 10x5"        Pulley abd   10x5" 10x5" 10x5"        Cross body adduction stretch 10x5" 10x5" 10x5" 10x5" 10x5"        UBE for ROM/strength   2'/2' lvl 1 2'/2' lvl 3 2.5' ea lvl 3        Doorway ER stretch     5x10"                                  Ther Activity                                       Gait Training                                       Modalities             Cold pack end 8' 8' 5' 5' 8'

## 2023-11-07 ENCOUNTER — OFFICE VISIT (OUTPATIENT)
Dept: PHYSICAL THERAPY | Facility: CLINIC | Age: 58
End: 2023-11-07
Payer: COMMERCIAL

## 2023-11-07 DIAGNOSIS — G89.29 CHRONIC RIGHT SHOULDER PAIN: ICD-10-CM

## 2023-11-07 DIAGNOSIS — M62.81 MUSCLE WEAKNESS OF RIGHT UPPER EXTREMITY: ICD-10-CM

## 2023-11-07 DIAGNOSIS — M25.511 CHRONIC RIGHT SHOULDER PAIN: ICD-10-CM

## 2023-11-07 DIAGNOSIS — S42.291D OTHER CLOSED DISPLACED FRACTURE OF PROXIMAL END OF RIGHT HUMERUS WITH ROUTINE HEALING, SUBSEQUENT ENCOUNTER: Primary | ICD-10-CM

## 2023-11-07 PROCEDURE — 97110 THERAPEUTIC EXERCISES: CPT | Performed by: PHYSICAL THERAPIST

## 2023-11-07 PROCEDURE — 97112 NEUROMUSCULAR REEDUCATION: CPT | Performed by: PHYSICAL THERAPIST

## 2023-11-07 PROCEDURE — 97140 MANUAL THERAPY 1/> REGIONS: CPT | Performed by: PHYSICAL THERAPIST

## 2023-11-07 NOTE — PROGRESS NOTES
Daily Note     Today's date: 2023  Patient name: Jennifer Sr  : 1965  MRN: [id number]  Referring provider: Reji Tan PA-C  Dx:   Encounter Diagnosis     ICD-10-CM    1. Other closed displaced fracture of proximal end of right humerus with routine healing, subsequent encounter  S42.291D       2. Chronic right shoulder pain  M25.511     G89.29       3. Muscle weakness of right upper extremity  M62.81                      Subjective: patient reports that she has tenderness on underside of arm, and still gets some numbness in her hand, but overall feels it is improving. Objective: See treatment diary below    162 flexion PROM  72 ER PROM    Assessment: Tolerated treatment well. Patient demonstrated significant improvement shoulder PROM today. Performed IASTM to patient's upper arm, medial and lateral to reduce soft tissue restrictions and help with neural tension. Patient noted less difficulty making fist following. Progressed RTC strengthening with resisted IR/ER with TB. Patient would benefit from continued skilled PT per plan of care to address impairments and improve function. Plan: Continue per plan of care. Progress mobility and strength as tolerated. Update home program NV. Precautions: AROM, PROM, gradual return to strengthening.  EPOC 12/15/23    Manuals 10/18 10/23 10/25 10/30 11/1 11/7       Shld PROM with jt stabilization  NICOLE INCOLE NICOLE NICOLE        IASTM upper arm, medial and lateral      NICOLE       STM UT/infraspinatus  NICOLE NICOLE                       Neuro Re-Ed                          Scap retraction  10x 10x          Median nerve flossing  nv 10x 15x 15x 15x       Median nerve stretch   10x 15x 10x        ER with retraction      2x10 gtb       IR with retraction      2x10 gtb       Shld ext with retraction    15x gtb 2x10 gtb 2x10 gtb       [address] with retraction    15x bltb 2x10 bltb 2x10 bltb       Ther Ex             Table slide flexion 10x5" 10x5"   Wall slide 10x Wall slide 10x 1# Table slide abd  10x5"           Wall circles   10 ea 10 ea 10 ea 1# 10 ea 1#       Wand flexion AAROM  10x5" 10x5" 10x 10x 10x       Pt edu on plan of care, pathology, home program 5'  3'          Wand ER AROM 10x5" 10x5" 10x5" 10x10" 10x        IR PROM behind back   10x5" 5x10" 5x10" 5x10"       Pendulums all directions  10 ea 10 ea          R UT stretch  5x10" 5x10"          Pulley flexion  10x5" 10x5" 10x5" 10x5"        Pulley abd   10x5" 10x5" 10x5"        Cross body adduction stretch 10x5" 10x5" 10x5" 10x5" 10x5" 10x5"       UBE for ROM/strength   2'/2' lvl 1 2'/2' lvl 3 2.5' ea lvl 3 2'/2' lvl 4       Doorway ER stretch     5x10" 5x10"                                 Ther Activity                                       Gait Training                                       Modalities             Cold pack end 8' 8' 5' 5' 8' 8'

## 2023-11-08 ENCOUNTER — APPOINTMENT (OUTPATIENT)
Dept: PHYSICAL THERAPY | Facility: CLINIC | Age: 58
End: 2023-11-08
Payer: COMMERCIAL

## 2023-11-09 ENCOUNTER — OFFICE VISIT (OUTPATIENT)
Dept: PHYSICAL THERAPY | Facility: CLINIC | Age: 58
End: 2023-11-09
Payer: COMMERCIAL

## 2023-11-09 DIAGNOSIS — G89.29 CHRONIC RIGHT SHOULDER PAIN: ICD-10-CM

## 2023-11-09 DIAGNOSIS — M62.81 MUSCLE WEAKNESS OF RIGHT UPPER EXTREMITY: ICD-10-CM

## 2023-11-09 DIAGNOSIS — S42.291D OTHER CLOSED DISPLACED FRACTURE OF PROXIMAL END OF RIGHT HUMERUS WITH ROUTINE HEALING, SUBSEQUENT ENCOUNTER: Primary | ICD-10-CM

## 2023-11-09 DIAGNOSIS — M25.511 CHRONIC RIGHT SHOULDER PAIN: ICD-10-CM

## 2023-11-09 PROCEDURE — 97110 THERAPEUTIC EXERCISES: CPT | Performed by: PHYSICAL THERAPIST

## 2023-11-09 PROCEDURE — 97112 NEUROMUSCULAR REEDUCATION: CPT | Performed by: PHYSICAL THERAPIST

## 2023-11-09 PROCEDURE — 97010 HOT OR COLD PACKS THERAPY: CPT | Performed by: PHYSICAL THERAPIST

## 2023-11-09 PROCEDURE — 97140 MANUAL THERAPY 1/> REGIONS: CPT | Performed by: PHYSICAL THERAPIST

## 2023-11-09 NOTE — PROGRESS NOTES
Daily Note     Today's date: 2023  Patient name: Elia Najjar  : 1965  MRN: [id number]  Referring provider: Don Peterson PA-C  Dx:   Encounter Diagnosis     ICD-10-CM    1. Other closed displaced fracture of proximal end of right humerus with routine healing, subsequent encounter  S42.291D       2. Chronic right shoulder pain  M25.511     G89.29       3. Muscle weakness of right upper extremity  M62.81                      Subjective: patient reports that she felt significant improvement from IASTM done last session. She feels that she has less tenderness now along her arm. Objective: See treatment diary below        Assessment: Tolerated treatment well. Patient tolerated IASTM well again and presented with less tenderness and soft tissue redness/restrictions. Progressed shoulder strengthening exercises with increased duration with UBE. Patient would benefit from continued skilled PT per plan of care to address impairments and improve function. Plan: Continue per plan of care. Progress mobility and strength as tolerated. Precautions: AROM, PROM, gradual return to strengthening.  EPOC 12/15/23    Manuals 10/18 10/23 10/25 10/30 11/1 11/7 11/9      Shld PROM with jt stabilization  NICOLE NICOLE NICOLE NICOLE  NICOLE      IASTM upper arm, medial and lateral      NICOLE NICOLE      STM UT/infraspinatus  NICOLE NICOLE                       Neuro Re-Ed                          Scap retraction  10x 10x          Median nerve flossing  nv 10x 15x 15x 15x       Radial nerve stretch       10x      Median nerve stretch   10x 15x 10x  10x      ER with retraction      2x10 gtb 2x10 gtb      IR with retraction      2x10 gtb 2x10 gtb      [address] ext with retraction    15x gtb 2x10 gtb 2x10 gtb 2x10 gtb      [address] with retraction    15x bltb 2x10 bltb 2x10 bltb 2x10 bltb      Ther Ex             Table slide flexion 10x5" 10x5"   Wall slide 10x Wall slide 10x 1# Wall slide 10x 1#      Table slide abd  10x5"           Wall circles   10 ea 10 ea 10 ea 1# 10 ea 1# 10 ea 1#      Wand flexion AAROM  10x5" 10x5" 10x 10x 10x       Pt edu on plan of care, pathology, home program 5'  3'    2'      Wand ER AROM 10x5" 10x5" 10x5" 10x10" 10x        IR PROM behind back   10x5" 5x10" 5x10" 5x10" 5x10"      Pendulums all directions  10 ea 10 ea          R UT stretch  5x10" 5x10"          Pulley flexion  10x5" 10x5" 10x5" 10x5"        Pulley abd   10x5" 10x5" 10x5"        Cross body adduction stretch 10x5" 10x5" 10x5" 10x5" 10x5" 10x5" 10x5"      UBE for ROM/strength   2'/2' lvl 1 2'/2' lvl 3 2.5' ea lvl 3 2'/2' lvl 4 3'/3' lvl 4      Doorway ER stretch     5x10" 5x10"                                 Ther Activity                                       Gait Training                                       Modalities             Cold pack end 8' 8' 5' 5' 8' 8' 8'

## 2023-11-13 ENCOUNTER — OFFICE VISIT (OUTPATIENT)
Dept: PHYSICAL THERAPY | Facility: CLINIC | Age: 58
End: 2023-11-13
Payer: COMMERCIAL

## 2023-11-13 DIAGNOSIS — M62.81 MUSCLE WEAKNESS OF RIGHT UPPER EXTREMITY: ICD-10-CM

## 2023-11-13 DIAGNOSIS — G89.29 CHRONIC RIGHT SHOULDER PAIN: ICD-10-CM

## 2023-11-13 DIAGNOSIS — M25.511 CHRONIC RIGHT SHOULDER PAIN: ICD-10-CM

## 2023-11-13 DIAGNOSIS — S42.291D OTHER CLOSED DISPLACED FRACTURE OF PROXIMAL END OF RIGHT HUMERUS WITH ROUTINE HEALING, SUBSEQUENT ENCOUNTER: Primary | ICD-10-CM

## 2023-11-13 PROCEDURE — 97110 THERAPEUTIC EXERCISES: CPT | Performed by: PHYSICAL THERAPIST

## 2023-11-13 PROCEDURE — 97140 MANUAL THERAPY 1/> REGIONS: CPT | Performed by: PHYSICAL THERAPIST

## 2023-11-13 PROCEDURE — 97112 NEUROMUSCULAR REEDUCATION: CPT | Performed by: PHYSICAL THERAPIST

## 2023-11-13 NOTE — PROGRESS NOTES
Daily Note     Today's date: 2023  Patient name: Mitchell Barriga  : 1965  MRN: [id number]  Referring provider: Malina Encarnacion PA-C  Dx:   Encounter Diagnosis     ICD-10-CM    1. Other closed displaced fracture of proximal end of right humerus with routine healing, subsequent encounter  S42.291D       2. Chronic right shoulder pain  M25.511     G89.29       3. Muscle weakness of right upper extremity  M62.81                      Subjective: patient reports that she did her home program over the weekend which went well. Her right arm still doesn't feel as strong as her left arm. She is cautious with trying to push up from the floor. Objective: See treatment diary below    Shld PROM, R  78 deg. ER  85 deg IR  166 deg. Flex    Assessment: Tolerated treatment well. Patient presented with less soft tissue tenderness along upper arm and forearm compared to last week with IASTM. Patient also demonstrated further improvements in shoulder PROM. Progressed strengthening with addition of supine chest press, to assist with pushing up off the floor. Patient would benefit from continued skilled PT per plan of care to address impairments and improve function. Plan: Continue per plan of care. Progress mobility and strength as tolerated. Precautions: AROM, PROM, gradual return to strengthening.  EPOC 12/15/23    Manuals 10/18 10/23 10/25 10/30 11/1 11/7 11/9 11/13     Shld PROM with jt stabilization  NICOLE NICOLE NICOLE NICOLE  NICOLE NICOLE     IASTM upper arm, medial and lateral      NICOLE NICOLE NICOLE     STM UT/infraspinatus  NICOLE NICOLE                       Neuro Re-Ed                          Scap retraction  10x 10x          Median nerve flossing  nv 10x 15x 15x 15x       Radial nerve stretch       10x 15x     Median nerve stretch   10x 15x 10x  10x 15x     ER with retraction      2x10 gtb 2x10 gtb 2x15 gtb     IR with retraction      2x10 gtb 2x10 gtb 2x15 gtb     [employer] ext with retraction    15x gtb 2x10 gtb 2x10 gtb 2x10 gtb 3x10 gtb Row with retraction    15x bltb 2x10 bltb 2x10 bltb 2x10 bltb 3x10 bltb     Ther Ex             Table slide flexion 10x5" 10x5"   Wall slide 10x Wall slide 10x 1# Wall slide 10x 1# Wall 10x 1.5#     Table slide abd  10x5"           Wall circles   10 ea 10 ea 10 ea 1# 10 ea 1# 10 ea 1# 10ea 1.5#     Wand flexion AAROM  10x5" 10x5" 10x 10x 10x       Pt edu on plan of care, pathology, home program 5'  3'    2'                   IR PROM behind back   10x5" 5x10" 5x10" 5x10" 5x10" 5x10"     Pendulums all directions  10 ea 10 ea          R UT stretch  5x10" 5x10"          Pulley flexion  10x5" 10x5" 10x5" 10x5"        Pulley abd   10x5" 10x5" 10x5"        Cross body adduction stretch 10x5" 10x5" 10x5" 10x5" 10x5" 10x5" 10x5"      UBE for ROM/strength   2'/2' lvl 1 2'/2' lvl 3 2.5' ea lvl 3 2'/2' lvl 4 3'/3' lvl 4 3'/3' lvl 4     Doorway ER stretch     5x10" 5x10"  5x10"     Supine chest press, UL                          Ther Activity                                       Gait Training                                       Modalities             Cold pack end 8' 8' 5' 5' 8' 8' 8'

## 2023-11-15 ENCOUNTER — OFFICE VISIT (OUTPATIENT)
Dept: PHYSICAL THERAPY | Facility: CLINIC | Age: 58
End: 2023-11-15
Payer: COMMERCIAL

## 2023-11-15 DIAGNOSIS — M62.81 MUSCLE WEAKNESS OF RIGHT UPPER EXTREMITY: ICD-10-CM

## 2023-11-15 DIAGNOSIS — M25.511 CHRONIC RIGHT SHOULDER PAIN: ICD-10-CM

## 2023-11-15 DIAGNOSIS — G89.29 CHRONIC RIGHT SHOULDER PAIN: ICD-10-CM

## 2023-11-15 DIAGNOSIS — S42.291D OTHER CLOSED DISPLACED FRACTURE OF PROXIMAL END OF RIGHT HUMERUS WITH ROUTINE HEALING, SUBSEQUENT ENCOUNTER: Primary | ICD-10-CM

## 2023-11-15 PROCEDURE — 97112 NEUROMUSCULAR REEDUCATION: CPT | Performed by: PHYSICAL THERAPIST

## 2023-11-15 PROCEDURE — 97110 THERAPEUTIC EXERCISES: CPT | Performed by: PHYSICAL THERAPIST

## 2023-11-15 PROCEDURE — 97140 MANUAL THERAPY 1/> REGIONS: CPT | Performed by: PHYSICAL THERAPIST

## 2023-11-15 NOTE — PROGRESS NOTES
Daily Note     Today's date: 11/15/2023  Patient name: Shasta Cardoza  : 1965  MRN: [id number]  Referring provider: Marianela Childs PA-C  Dx:   Encounter Diagnosis     ICD-10-CM    1. Other closed displaced fracture of proximal end of right humerus with routine healing, subsequent encounter  S42.291D       2. Chronic right shoulder pain  M25.511     G89.29       3. Muscle weakness of right upper extremity  M62.81                      Subjective: patient reports that she still gets some soreness when lying half on R shoulder but it's getting less and less. She also reports less numbness/coldness sensation in her hand recently. Objective: See treatment diary below    IR BTB T7 spinal level    Assessment: Tolerated treatment well. Targeted IASTM to biceps and deltoid today due to area of soreness with IR AROM. Patient tolerated further exercise progression with increase in resistance for wall slides and the addition of resisted flexion/scaption. Patient would benefit from continued skilled PT per plan of care to address impairments and improve function. Plan: Continue per plan of care. Progress mobility and strength as tolerated. Precautions: AROM, PROM, gradual return to strengthening.  EPOC 12/15/23    Manuals 10/18 10/23 10/25 10/30 11/1 11/7 11/9 11/13 11/15    Shld PROM with jt stabilization  NICOLE NICOLE NICOLE NICOLE  NICOLE NICOLE NICOLE    IASTM upper arm, medial and lateral      NICOLE NICOLE NICOLE NICOLE    STM UT/infraspinatus  NICOLE NICOLE                       Neuro Re-Ed                          Scap retraction  10x 10x          Median nerve flossing  nv 10x 15x 15x 15x       Radial nerve stretch       10x 15x 15x    Median nerve stretch   10x 15x 10x  10x 15x 15x    ER with retraction      2x10 gtb 2x10 gtb 2x15 gtb 2x15 gtb    IR with retraction      2x10 gtb 2x10 gtb 2x15 gtb 2x15 gtb    CarMax ext with retraction    15x gtb 2x10 gtb 2x10 gtb 2x10 gtb 3x10 gtb 3x10 gtb    Row with retraction    15x bltb 2x10 bltb 2x10 bltb 2x10 bltb 3x10 bltb 3x10 bltb    Ther Ex             Table slide flexion 10x5" 10x5"   Wall slide 10x Wall slide 10x 1# Wall slide 10x 1# Wall 10x 1.5# Wall 10x 2#    Table slide abd  10x5"           Wall circles   10 ea 10 ea 10 ea 1# 10 ea 1# 10 ea 1# 10ea 1.5# 10 ea 2#    Wand flexion AAROM  10x5" 10x5" 10x 10x 10x   10x supine    Pt edu on plan of care, pathology, home program 5'  3'    2'                   IR PROM behind back   10x5" 5x10" 5x10" 5x10" 5x10" 5x10" 5x10"    Pendulums all directions  10 ea 10 ea                       Pulley flexion  10x5" 10x5" 10x5" 10x5"        Pulley abd   10x5" 10x5" 10x5"        Cross body adduction stretch 10x5" 10x5" 10x5" 10x5" 10x5" 10x5" 10x5"      UBE for ROM/strength   2'/2' lvl 1 2'/2' lvl 3 2.5' ea lvl 3 2'/2' lvl 4 3'/3' lvl 4 3'/3' lvl 4 3'/3' lvl 4    Doorway ER stretch     5x10" 5x10"  5x10" 5x10"    Supine chest press, UL        2x10 2# 2x10 3#    Resisted shld flex         10x rtb    Resisted shld scap         10x rtb    Ther Activity                                       Gait Training                                       Modalities             Cold pack end 8' 8' 5' 5' 8' 8' 8'

## 2023-11-20 ENCOUNTER — OFFICE VISIT (OUTPATIENT)
Dept: PHYSICAL THERAPY | Facility: CLINIC | Age: 58
End: 2023-11-20
Payer: COMMERCIAL

## 2023-11-20 DIAGNOSIS — M25.511 CHRONIC RIGHT SHOULDER PAIN: ICD-10-CM

## 2023-11-20 DIAGNOSIS — G89.29 CHRONIC RIGHT SHOULDER PAIN: ICD-10-CM

## 2023-11-20 DIAGNOSIS — M62.81 MUSCLE WEAKNESS OF RIGHT UPPER EXTREMITY: ICD-10-CM

## 2023-11-20 DIAGNOSIS — S42.291D OTHER CLOSED DISPLACED FRACTURE OF PROXIMAL END OF RIGHT HUMERUS WITH ROUTINE HEALING, SUBSEQUENT ENCOUNTER: Primary | ICD-10-CM

## 2023-11-20 PROCEDURE — 97140 MANUAL THERAPY 1/> REGIONS: CPT | Performed by: PHYSICAL THERAPIST

## 2023-11-20 PROCEDURE — 97110 THERAPEUTIC EXERCISES: CPT | Performed by: PHYSICAL THERAPIST

## 2023-11-20 PROCEDURE — 97112 NEUROMUSCULAR REEDUCATION: CPT | Performed by: PHYSICAL THERAPIST

## 2023-11-20 NOTE — PROGRESS NOTES
Daily Note     Today's date: 2023  Patient name: Bouchra Berg  : 1965  MRN: [id number]  Referring provider: Jesse Sarkar PA-C  Dx:   Encounter Diagnosis     ICD-10-CM    1. Other closed displaced fracture of proximal end of right humerus with routine healing, subsequent encounter  S42.291D       2. Chronic right shoulder pain  M25.511     G89.29       3. Muscle weakness of right upper extremity  M62.81                      Subjective: patient reports that she gets some dull aching when using her arm a lot, like when cleaning her home, but not bad. She is starting to use her right arm more like normal again. She hasn't tried lifting her grandkids with her right arm yet. Objective: See treatment diary below      Assessment: Tolerated treatment well. Progressed UE strengthening with addition of resisted shoulder abduction, biceps curls, and bent over triceps extension to prepare patient for workouts with home setup. Significant trigger point palpated along infraspinatus so performed trigger point release and instructed patient in self TPR with tennis ball. Patient would benefit from continued skilled PT per plan of care to address impairments and improve function. Plan: Continue per plan of care. Progress mobility and strength as tolerated. Progress Report NV. Progress functional strengthening with simulated child lift. Precautions: AROM, PROM, gradual return to strengthening.  EPOC 12/15/23    Manuals 10/18 10/23 10/25 10/30 11/1 11/7 11/9 11/13 11/15 11/20   Shld PROM with jt stabilization  NICOLE NICOLE NICOLE NICOLE  NICOLE NICOLE NICOLE NICOLE   IASTM upper arm, medial and lateral      NICOLE NICOLE NICOLE NICOLE NICOLE    STM UT/infraspinatus  NICOLE NICOLE       NICOLE TPR                Neuro Re-Ed                          Scap retraction  10x 10x          Median nerve flossing  nv 10x 15x 15x 15x       Radial nerve stretch       10x 15x 15x 15x   Median nerve stretch   10x 15x 10x  10x 15x 15x 15x   ER with retraction      2x10 gtb 2x10 gtb 2x15 gtb 2x15 gtb 2x15 gtb   IR with retraction      2x10 gtb 2x10 gtb 2x15 gtb 2x15 gtb 2x15 gtb   CarMax ext with retraction    15x gtb 2x10 gtb 2x10 gtb 2x10 gtb 3x10 gtb 3x10 gtb 2x10 bltb   Row with retraction    15x bltb 2x10 bltb 2x10 bltb 2x10 bltb 3x10 bltb 3x10 bltb 3x10 bltb   Ther Ex             Table slide flexion 10x5" 10x5"   Wall slide 10x Wall slide 10x 1# Wall slide 10x 1# Wall 10x 1.5# Wall 10x 2# Wall 10x 2#   Table slide abd  10x5"           Wall circles   10 ea 10 ea 10 ea 1# 10 ea 1# 10 ea 1# 10ea 1.5# 10 ea 2# 10 ea 2#   Wand flexion AAROM  10x5" 10x5" 10x 10x 10x   10x supine    Pt edu on plan of care, pathology, home program 5'  3'    2'                   IR PROM behind back   10x5" 5x10" 5x10" 5x10" 5x10" 5x10" 5x10" 5x10"   Bent over triceps ext          10x 5#   Biceps curls          10x 5#   Pulley flexion  10x5" 10x5" 10x5" 10x5"        Pulley abd   10x5" 10x5" 10x5"        Cross body adduction stretch 10x5" 10x5" 10x5" 10x5" 10x5" 10x5" 10x5"      UBE for ROM/strength   2'/2' lvl 1 2'/2' lvl 3 2.5' ea lvl 3 2'/2' lvl 4 3'/3' lvl 4 3'/3' lvl 4 3'/3' lvl 4 3'/3' lvl 4   Doorway ER stretch     5x10" 5x10"  5x10" 5x10" 5x10"   Supine chest press, UL        2x10 2# 2x10 3#    Resisted shld flex         10x rtb 10x 2#   Resisted shld abd          10x 2#   Resisted shld scap         10x rtb 10x 2#   Ther Activity                                       Gait Training                                       Modalities             Cold pack end 8' 8' 5' 5' 8' 8' 8'

## 2023-11-22 ENCOUNTER — OFFICE VISIT (OUTPATIENT)
Dept: PHYSICAL THERAPY | Facility: CLINIC | Age: 58
End: 2023-11-22
Payer: COMMERCIAL

## 2023-11-22 ENCOUNTER — APPOINTMENT (OUTPATIENT)
Dept: RADIOLOGY | Facility: CLINIC | Age: 58
End: 2023-11-22
Payer: COMMERCIAL

## 2023-11-22 ENCOUNTER — OFFICE VISIT (OUTPATIENT)
Dept: OBGYN CLINIC | Facility: CLINIC | Age: 58
End: 2023-11-22

## 2023-11-22 VITALS
HEIGHT: 65 IN | SYSTOLIC BLOOD PRESSURE: 132 MMHG | WEIGHT: 140 LBS | BODY MASS INDEX: 23.32 KG/M2 | DIASTOLIC BLOOD PRESSURE: 78 MMHG

## 2023-11-22 DIAGNOSIS — S42.291D OTHER CLOSED DISPLACED FRACTURE OF PROXIMAL END OF RIGHT HUMERUS WITH ROUTINE HEALING, SUBSEQUENT ENCOUNTER: ICD-10-CM

## 2023-11-22 DIAGNOSIS — M62.81 MUSCLE WEAKNESS OF RIGHT UPPER EXTREMITY: ICD-10-CM

## 2023-11-22 DIAGNOSIS — M25.511 CHRONIC RIGHT SHOULDER PAIN: ICD-10-CM

## 2023-11-22 DIAGNOSIS — S42.291D OTHER CLOSED DISPLACED FRACTURE OF PROXIMAL END OF RIGHT HUMERUS WITH ROUTINE HEALING, SUBSEQUENT ENCOUNTER: Primary | ICD-10-CM

## 2023-11-22 DIAGNOSIS — G89.29 CHRONIC RIGHT SHOULDER PAIN: ICD-10-CM

## 2023-11-22 PROCEDURE — 97140 MANUAL THERAPY 1/> REGIONS: CPT | Performed by: PHYSICAL THERAPIST

## 2023-11-22 PROCEDURE — 73030 X-RAY EXAM OF SHOULDER: CPT

## 2023-11-22 PROCEDURE — 97112 NEUROMUSCULAR REEDUCATION: CPT | Performed by: PHYSICAL THERAPIST

## 2023-11-22 PROCEDURE — 99024 POSTOP FOLLOW-UP VISIT: CPT | Performed by: ORTHOPAEDIC SURGERY

## 2023-11-22 PROCEDURE — 97110 THERAPEUTIC EXERCISES: CPT | Performed by: PHYSICAL THERAPIST

## 2023-11-22 NOTE — PROGRESS NOTES
Progress Report     Today's date: 2023  Patient name: Lanny Strickland  : 1965  MRN: [id number]  Referring provider: Sidney Rivas PA-C  Dx:   Encounter Diagnosis     ICD-10-CM    1. Other closed displaced fracture of proximal end of right humerus with routine healing, subsequent encounter  S42.291D       2. Chronic right shoulder pain  M25.511     G89.29       3. Muscle weakness of right upper extremity  M62.81                      Subjective: Patient reports overall feeling improvement in her shoulder. She still has difficulty reaching behind back, lying on her shoulder for long periods, or heavy lifting at work (boxes) or grandchildren. Patient reports at worst, 3/10 soreness in the past week, typically after PT sessions but it doesn't last long. Objective: See treatment diary below    AROM-  ER (BTH)- T3 R, T4 L  Flexion- 160 degrees R, 165 degrees L  IR (BTB)- T8 R, T6 L,   Abduction- 164 degrees     PROM- (R shoulder)  ER- 73 degrees  Flexion- 165 degrees  IR- 90 degrees  Abduction- 160 degrees    R shoulder MMT  Flexion 4+  Abduction 4+  ER 4+  IR 4+        Assessment: Patient has been treated for 11 sessions since evaluation 5 weeks ago. Treatment has consisted of manual therapy to improve ROM and soft tissue restrictions, therapeutic exercises and activities to improve shoulder strength and flexibility, neuromuscular reeducation to improve scapular stabilization, and patient education on home program. Objectively, patient demonstrates significant improvements in both shoulder ROM and strength. She is most limited with abduction and ER ROM, but making weekly improvements. Functionally, patient notes much less pain and shoulder weakness. Patient is responding well to treatment so far and would benefit from continued skilled PT per plan of care to address impairments and improve function. Plan: Continue per plan of care. Progress mobility and strength as tolerated.      Precautions: AROM, PROM, gradual return to strengthening.  EPOC 12/15/23    Manuals 11/22 10/23 10/25 10/30 11/1 11/7 11/9 11/13 11/15 11/20   Shld PROM with jt stabilization NICOLE NICOLE NICOLE NICOLE NICOLE  NICOLE NICOLE NICOLE NICOLE   IASTM upper arm, medial and lateral NICOLE     NICOLE NICOLE NICOLE NICOLE NICOLE    STM UT/infraspinatus NICOLE NICOLE NICOLE       NICOLE TPR                Neuro Re-Ed                                       Median nerve flossing  nv 10x 15x 15x 15x       Radial nerve stretch       10x 15x 15x 15x   Median nerve stretch   10x 15x 10x  10x 15x 15x 15x   ER with retraction 2x15 gtb     2x10 gtb 2x10 gtb 2x15 gtb 2x15 gtb 2x15 gtb   IR with retraction 2x15 gtb     2x10 gtb 2x10 gtb 2x15 gtb 2x15 gtb 2x15 gtb   CarMax ext with retraction 3x10 bltb   15x gtb 2x10 gtb 2x10 gtb 2x10 gtb 3x10 gtb 3x10 gtb 2x10 bltb   Row with retraction 3x10 bltb   15x bltb 2x10 bltb 2x10 bltb 2x10 bltb 3x10 bltb 3x10 bltb 3x10 bltb   Ther Ex             Table slide flexion  10x5"   Wall slide 10x Wall slide 10x 1# Wall slide 10x 1# Wall 10x 1.5# Wall 10x 2# Wall 10x 2#   Table slide abd  10x5"           Wall circles   10 ea 10 ea 10 ea 1# 10 ea 1# 10 ea 1# 10ea 1.5# 10 ea 2# 10 ea 2#   Wand flexion AAROM  10x5" 10x5" 10x 10x 10x   10x supine    Pt edu on plan of care, pathology, home program   3'    2'                   IR PROM behind back 5x10"  10x5" 5x10" 5x10" 5x10" 5x10" 5x10" 5x10" 5x10"   Bent over triceps ext 2x10 5#         10x 5#   Biceps curls 2x10 5#         10x 5#   Pulley flexion  10x5" 10x5" 10x5" 10x5"        Pulley abd   10x5" 10x5" 10x5"        Cross body adduction stretch  10x5" 10x5" 10x5" 10x5" 10x5" 10x5"      UBE for ROM/strength 3'/3' lvl 4  2'/2' lvl 1 2'/2' lvl 3 2.5' ea lvl 3 2'/2' lvl 4 3'/3' lvl 4 3'/3' lvl 4 3'/3' lvl 4 3'/3' lvl 4   Doorway ER stretch 5x10"    5x10" 5x10"  5x10" 5x10" 5x10"   Supine chest press, UL        2x10 2# 2x10 3#    Resisted shld flex 10x 2#        10x rtb 10x 2#   Resisted shld abd 10x 2#         10x 2#   Resisted shld scap 10x 2#        10x rtb 10x 2#   Ther Activity             Simulated baby  20-25#                         Gait Training                                       Modalities             Cold pack end  8' 5' 5' 8' 8' 8'

## 2023-11-22 NOTE — ASSESSMENT & PLAN NOTE
58-year-old female who presents to the office today for follow up evaluation status post closed treatment for a right proximal humerus comminuted fracture with mild displacement, DOI 8/29/23. The patient is doing well. X-rays were reviewed which demonstrate healing. She can continue with formal therapy and transition to [facility]. I discussed she may always have some stiffness reaching behind her back. She can continue with activities as tolerated. She may follow up with me as needed. All patient's questions were answered to her satisfaction. This note is created using dictation transcription. It may contain typographical errors, grammatical errors, improperly dictated words, background noise and other errors.

## 2023-11-22 NOTE — PROGRESS NOTES
Assessment:     1. Other closed displaced fracture of proximal end of right humerus with routine healing, subsequent encounter        Plan:     Problem List Items Addressed This Visit          Musculoskeletal and Integument    Closed fracture of right proximal humerus - Primary     58-year-old female who presents to the office today for follow up evaluation status post closed treatment for a right proximal humerus comminuted fracture with mild displacement, DOI 8/29/23. The patient is doing well. X-rays were reviewed which demonstrate healing. She can continue with formal therapy and transition to HEP. I discussed she may always have some stiffness reaching behind her back. She can continue with activities as tolerated. She may follow up with me as needed. All patient's questions were answered to her satisfaction. This note is created using dictation transcription. It may contain typographical errors, grammatical errors, improperly dictated words, background noise and other errors. Relevant Orders    XR shoulder 2+ vw right      Subjective:     Patient ID: Mell Ferrari is a 60 y.o. female. Chief Complaint:  58-year-old female who presents to the office today for follow up evaluation status post closed treatment for a right proximal humerus comminuted fracture with mild displacement, DOI 8/29/23. The patient states she is doing well. She denies any pain. She has been attending therapy and has been making progress. She has been compliant with HEP. She notes some tightness and pulling reaching behind her back. She denies any numbness or tingling. She is not taking anything OTC for pain. Allergy:  No Known Allergies  Medications:  all current active meds have been reviewed  Past Medical History:  History reviewed. No pertinent past medical history. Past Surgical History:  History reviewed. No pertinent surgical history. Family History:  History reviewed. No pertinent family history.   Social History:  Social History     Substance and Sexual Activity   Alcohol Use None     Social History     Substance and Sexual Activity   Drug Use Not on file     Social History     Tobacco Use   Smoking Status Never   Smokeless Tobacco Never     Review of Systems   Constitutional:  Negative for chills and fever. HENT:  Negative for drooling and sneezing. Eyes:  Negative for redness. Respiratory:  Negative for cough and wheezing. Cardiovascular: Negative. Gastrointestinal:  Negative for nausea and vomiting. Genitourinary: Negative. Musculoskeletal:  Negative for arthralgias, joint swelling and myalgias. Neurological:  Negative for weakness and numbness. Psychiatric/Behavioral:  Negative for behavioral problems. The patient is not nervous/anxious. Objective:  BP Readings from Last 1 Encounters:   11/22/23 132/78      Wt Readings from Last 1 Encounters:   11/22/23 63.5 kg (140 lb)      BMI:   Estimated body mass index is 23.3 kg/m² as calculated from the following:    Height as of this encounter: 5' 5" (1.651 m). Weight as of this encounter: 63.5 kg (140 lb). BSA:   Estimated body surface area is 1.7 meters squared as calculated from the following:    Height as of this encounter: 5' 5" (1.651 m). Weight as of this encounter: 63.5 kg (140 lb). Physical Exam  Vitals and nursing note reviewed. Constitutional:       Appearance: Normal appearance. She is well-developed. HENT:      Head: Normocephalic and atraumatic. Right Ear: External ear normal.      Left Ear: External ear normal.   Eyes:      General: No scleral icterus. Right eye: No discharge. Left eye: No discharge. Extraocular Movements: Extraocular movements intact. Conjunctiva/sclera: Conjunctivae normal.   Pulmonary:      Effort: Pulmonary effort is normal. No respiratory distress. Musculoskeletal:      Cervical back: Neck supple.       Comments: As noted in HPI   Skin:     General: Skin is warm and dry. Neurological:      Mental Status: She is alert and oriented to person, place, and time. Deep Tendon Reflexes: Reflexes are normal and symmetric. Psychiatric:         Mood and Affect: Mood normal.         Behavior: Behavior normal.       Right Shoulder Exam     Range of Motion   Active abduction:  160   Passive abduction:  170   Forward flexion:  170   Internal rotation 0 degrees:  Lumbar     Muscle Strength   The patient has normal right shoulder strength. Tests   Cross arm: negative  Impingement: negative  Drop arm: negative    Other   Erythema: absent  Sensation: normal  Pulse: present            I have personally reviewed pertinent films in PACS and my interpretation is x-rays right shoulder demonstrate healing proximal humerus fracture.     Scribe Attestation      I,:  Margarita Sands MA am acting as a scribe while in the presence of the attending physician.:       I,:  Vilma Goldberg, MD personally performed the services described in this documentation    as scribed in my presence.:

## 2023-11-27 ENCOUNTER — OFFICE VISIT (OUTPATIENT)
Dept: PHYSICAL THERAPY | Facility: CLINIC | Age: 58
End: 2023-11-27
Payer: COMMERCIAL

## 2023-11-27 DIAGNOSIS — M62.81 MUSCLE WEAKNESS OF RIGHT UPPER EXTREMITY: ICD-10-CM

## 2023-11-27 DIAGNOSIS — M25.511 CHRONIC RIGHT SHOULDER PAIN: ICD-10-CM

## 2023-11-27 DIAGNOSIS — G89.29 CHRONIC RIGHT SHOULDER PAIN: ICD-10-CM

## 2023-11-27 DIAGNOSIS — S42.291D OTHER CLOSED DISPLACED FRACTURE OF PROXIMAL END OF RIGHT HUMERUS WITH ROUTINE HEALING, SUBSEQUENT ENCOUNTER: Primary | ICD-10-CM

## 2023-11-27 PROCEDURE — 97140 MANUAL THERAPY 1/> REGIONS: CPT

## 2023-11-27 PROCEDURE — 97110 THERAPEUTIC EXERCISES: CPT

## 2023-11-27 NOTE — PROGRESS NOTES
Progress Report     Today's date: 2023  Patient name: Anastacia Lancaster  : 1965  MRN: [id number]  Referring provider: Yuriy Marion PA-C  Dx:   Encounter Diagnosis     ICD-10-CM    1. Other closed displaced fracture of proximal end of right humerus with routine healing, subsequent encounter  S42.291D       2. Chronic right shoulder pain  M25.511     G89.29       3. Muscle weakness of right upper extremity  M62.81                      Subjective: Patient had recheck with Dr. Arcelia Peabody. Pleased with overall progress. Patient reports she is doing well and pleased with her progress. Objective: See treatment diary below      Assessment: Patient is responding well to treatment so far and would benefit from continued skilled PT per plan of care to address impairments and improve function. Added standing IR/ER reach at wall for improved mobility. Appropriate fatigues with exercises without increase in pain. Plan: Continue per plan of care. Progress mobility and strength as tolerated. Precautions: AROM, PROM, gradual return to strengthening.  EPOC 12/15/23    Manuals 11/22 11/27 10/25 10/30 11/1 11/7 11/9 11/13 11/15 11/20   Shld PROM with jt stabilization NICOLE ksg NICOLE NICOLE NICOLE  NICOLE NICOLE NICOLE NICOLE   IASTM upper arm, medial and lateral NICOLE ksg    NICOLE NICOLE NICOLE NICOLE NICOLE    STM UT/infraspinatus NICOLE ksg NICOLE       NICOLE TPR                Neuro Re-Ed                                       Median nerve flossing   10x 15x 15x 15x       Radial nerve stretch       10x 15x 15x 15x   Median nerve stretch   10x 15x 10x  10x 15x 15x 15x   ER with retraction 2x15 gtb 2x15 gtb    2x10 gtb 2x10 gtb 2x15 gtb 2x15 gtb 2x15 gtb   IR with retraction 2x15 gtb 2x15 gtb    2x10 gtb 2x10 gtb 2x15 gtb 2x15 gtb 2x15 gtb   CarMax ext with retraction 3x10 bltb 3x10 bltb  15x gtb 2x10 gtb 2x10 gtb 2x10 gtb 3x10 gtb 3x10 gtb 2x10 bltb   Row with retraction 3x10 bltb 3x10  bltb  15x bltb 2x10 bltb 2x10 bltb 2x10 bltb 3x10 bltb 3x10 bltb 3x10 bltb   Ther Ex Table slide flexion     Wall slide 10x Wall slide 10x 1# Wall slide 10x 1# Wall 10x 1.5# Wall 10x 2# Wall 10x 2#   Table slide abd             Wall circles   10 ea 10 ea 10 ea 1# 10 ea 1# 10 ea 1# 10ea 1.5# 10 ea 2# 10 ea 2#   Wand flexion AAROM   10x5" 10x 10x 10x   10x supine    Pt edu on plan of care, pathology, home program   3'    2'                   IR PROM behind back 5x10" 5x10" 10x5" 5x10" 5x10" 5x10" 5x10" 5x10" 5x10" 5x10"   Bent over triceps ext 2x10 5# 2x10 5#        10x 5#   Biceps curls 2x10 5# 2x10 5#        10x 5#   Pulley flexion   10x5" 10x5" 10x5"        Pulley abd   10x5" 10x5" 10x5"        Cross body adduction stretch   10x5" 10x5" 10x5" 10x5" 10x5"      UBE for ROM/strength 3'/3' lvl 4 3'/3'  Lvl 4 2'/2' lvl 1 2'/2' lvl 3 2.5' ea lvl 3 2'/2' lvl 4 3'/3' lvl 4 3'/3' lvl 4 3'/3' lvl 4 3'/3' lvl 4   Doorway ER stretch 5x10" 5x10"   5x10" 5x10"  5x10" 5x10" 5x10"   Supine chest press, UL        2x10 2# 2x10 3#    Resisted shld flex 10x 2# 10x  2#       10x rtb 10x 2#   Resisted shld abd 10x 2# 10x  2#        10x 2#   Resisted shld scap 10x 2# 10x  2#       10x rtb 10x 2#   Standing at wall for IR/ER reach  2x10           Modified push up on table  NV           Ther Activity             Simulated baby  20-25# 20-25#                        Gait Training                                       Modalities             Cold pack end   5' 5' 8' 8' 8'

## 2023-11-29 ENCOUNTER — OFFICE VISIT (OUTPATIENT)
Dept: PHYSICAL THERAPY | Facility: CLINIC | Age: 58
End: 2023-11-29
Payer: COMMERCIAL

## 2023-11-29 DIAGNOSIS — G89.29 CHRONIC RIGHT SHOULDER PAIN: ICD-10-CM

## 2023-11-29 DIAGNOSIS — S42.291D OTHER CLOSED DISPLACED FRACTURE OF PROXIMAL END OF RIGHT HUMERUS WITH ROUTINE HEALING, SUBSEQUENT ENCOUNTER: Primary | ICD-10-CM

## 2023-11-29 DIAGNOSIS — M62.81 MUSCLE WEAKNESS OF RIGHT UPPER EXTREMITY: ICD-10-CM

## 2023-11-29 DIAGNOSIS — M25.511 CHRONIC RIGHT SHOULDER PAIN: ICD-10-CM

## 2023-11-29 PROCEDURE — 97110 THERAPEUTIC EXERCISES: CPT | Performed by: PHYSICAL THERAPIST

## 2023-11-29 PROCEDURE — 97140 MANUAL THERAPY 1/> REGIONS: CPT | Performed by: PHYSICAL THERAPIST

## 2023-11-29 PROCEDURE — 97112 NEUROMUSCULAR REEDUCATION: CPT | Performed by: PHYSICAL THERAPIST

## 2023-11-29 NOTE — PROGRESS NOTES
Daily Note    Today's date: 2023  Patient name: John Morrow  : 1965  MRN: [id number]  Referring provider: Barb Young PA-C  Dx:   Encounter Diagnosis     ICD-10-CM    1. Other closed displaced fracture of proximal end of right humerus with routine healing, subsequent encounter  S42.291D       2. Chronic right shoulder pain  M25.511     G89.29       3. Muscle weakness of right upper extremity  M62.81                      Subjective: Patient reports some tightness in upper recently which she attributes to getting out winter clothes and using her arm at work. She was able to lift grandchild over the weekend without issue. Objective: See treatment diary below      Assessment: Minimal trigger points present along UT and infraspinatus today. Progressed RTC strength with addition of IR/ER with maintaining abduction during exercise. Patient is progressing well with therapy and would benefit from continued skilled PT per plan of care. Plan: Continue per plan of care. Progress mobility and strength as tolerated. Transition to 1x per week with greater emphasis on home program.     Precautions: AROM, PROM, gradual return to strengthening.  EPOC 12/15/23    Manuals 11/22 11/27 11/29 10/30 11/1 11/7 11/9 11/13 11/15 11/20   Shld PROM with jt stabilization NICOLE ksg NICOLE NICOLE NICOLE  NICOLE NICOLE NICOLE NICOLE   IASTM upper arm, medial and lateral NICOLE ksg NICOLE   NICOLE NICOLE NICOLE NICOLE NICOLE    STM UT/infraspinatus NICOLE ksg NICOLE       NICOLE TPR                Neuro Re-Ed                                       Median nerve flossing    15x 15x 15x       Radial nerve stretch   15x    10x 15x 15x 15x   Median nerve stretch   15x 15x 10x  10x 15x 15x 15x   ER with retraction 2x15 gtb 2x15 gtb 2x15 gtb w/abd   2x10 gtb 2x10 gtb 2x15 gtb 2x15 gtb 2x15 gtb   IR with retraction 2x15 gtb 2x15 gtb 2x15 gtb w/abd   2x10 gtb 2x10 gtb 2x15 gtb 2x15 gtb 2x15 gtb   CarMax ext with retraction 3x10 bltb 3x10 bltb  15x gtb 2x10 gtb 2x10 gtb 2x10 gtb 3x10 gtb 3x10 gtb 2x10 bltb Row with retraction 3x10 bltb 3x10  bltb  15x bltb 2x10 bltb 2x10 bltb 2x10 bltb 3x10 bltb 3x10 bltb 3x10 bltb   Ther Ex             Table slide flexion   Wall flex 10x10"  Wall slide 10x Wall slide 10x 1# Wall slide 10x 1# Wall 10x 1.5# Wall 10x 2# Wall 10x 2#   Table slide abd   Wall abd 10x10"          Wall circles   10 ea 10 ea 10 ea 1# 10 ea 1# 10 ea 1# 10ea 1.5# 10 ea 2# 10 ea 2#   Wand flexion AAROM    10x 10x 10x   10x supine    Pt edu on plan of care, pathology, home program       2'                   IR PROM behind back 5x10" 5x10" 10x5" 5x10" 5x10" 5x10" 5x10" 5x10" 5x10" 5x10"   Bent over triceps ext 2x10 5# 2x10 5# 2x10 5#       10x 5#   Biceps curls 2x10 5# 2x10 5# 2x10 5#       10x 5#   Pulley flexion    10x5" 10x5"        Pulley abd    10x5" 10x5"                     UBE for ROM/strength 3'/3' lvl 4 3'/3'  Lvl 4 3'/3'  Lvl 5 2'/2' lvl 3 2.5' ea lvl 3 2'/2' lvl 4 3'/3' lvl 4 3'/3' lvl 4 3'/3' lvl 4 3'/3' lvl 4   Doorway ER stretch 5x10" 5x10" 5x10"  5x10" 5x10"  5x10" 5x10" 5x10"   Supine chest press, UL        2x10 2# 2x10 3#    Resisted shld flex 10x 2# 10x  2#       10x rtb 10x 2#   Resisted shld abd 10x 2# 10x  2#        10x 2#   Resisted shld scap 10x 2# 10x  2#       10x rtb 10x 2#   Standing at wall for IR/ER reach  2x10 20x          Modified push up on table  NV           Ther Activity             Simulated baby  20-25# 20-25#                        Gait Training                                       Modalities             Cold pack end    5' 8' 8' 8'

## 2023-12-07 ENCOUNTER — OFFICE VISIT (OUTPATIENT)
Dept: PHYSICAL THERAPY | Facility: CLINIC | Age: 58
End: 2023-12-07
Payer: COMMERCIAL

## 2023-12-07 DIAGNOSIS — G89.29 CHRONIC RIGHT SHOULDER PAIN: ICD-10-CM

## 2023-12-07 DIAGNOSIS — M25.511 CHRONIC RIGHT SHOULDER PAIN: ICD-10-CM

## 2023-12-07 DIAGNOSIS — S42.291D OTHER CLOSED DISPLACED FRACTURE OF PROXIMAL END OF RIGHT HUMERUS WITH ROUTINE HEALING, SUBSEQUENT ENCOUNTER: Primary | ICD-10-CM

## 2023-12-07 DIAGNOSIS — M62.81 MUSCLE WEAKNESS OF RIGHT UPPER EXTREMITY: ICD-10-CM

## 2023-12-07 PROCEDURE — 97110 THERAPEUTIC EXERCISES: CPT | Performed by: PHYSICAL THERAPIST

## 2023-12-07 PROCEDURE — 97112 NEUROMUSCULAR REEDUCATION: CPT | Performed by: PHYSICAL THERAPIST

## 2023-12-07 PROCEDURE — 97140 MANUAL THERAPY 1/> REGIONS: CPT | Performed by: PHYSICAL THERAPIST

## 2023-12-07 NOTE — PROGRESS NOTES
Daily Note    Today's date: 2023  Patient name: Jennifer Sr  : 1965  MRN: [id number]  Referring provider: Reji Tan PA-C  Dx:   Encounter Diagnosis     ICD-10-CM    1. Other closed displaced fracture of proximal end of right humerus with routine healing, subsequent encounter  S42.291D       2. Chronic right shoulder pain  M25.511     G89.29       3. Muscle weakness of right upper extremity  M62.81                      Subjective: Patient reports that her shoulder continues to improve, and she has been able to sleep on her shoulder lately without pain. Objective: See treatment diary below      Assessment: Patient has less soft tissue restrictions along infraspinatus today. Patient noted some clicking in thoracic spine with resisted shoulder flexion/scaption, so progressed scapular strengthening with b/l ER and retraction and horizontal abduction. Patient is progressing well with therapy and would benefit from continued skilled PT per plan of care. Plan: Continue per plan of care. Progress mobility and strength as tolerated. Transition to 1x per week with greater emphasis on home program.     Precautions: AROM, PROM, gradual return to strengthening.  EPOC 12/15/23    Manuals 11/22 11/27 11/29 12/7 11/1 11/7 11/9 11/13 11/15 11/20   Shld PROM with jt stabilization NICOLE ksg NICOLE NICOLE NICOLE  NICOLE NICOLE NICOLE NICOLE   IASTM upper arm, medial and lateral NICOLE ksg NICOLE   NICOLE NICOLE NICOLE NICOLE NICOLE    STM UT/infraspinatus NICOLE ksg NICOLE NICOLE      NICOLE TPR                Neuro Re-Ed             Horizontal abd    2x10 rtb         No  money    2x10 rtb         Median nerve flossing     15x 15x       Radial nerve stretch   15x    10x 15x 15x 15x   Median nerve stretch   15x  10x  10x 15x 15x 15x   ER with retraction 2x15 gtb 2x15 gtb 2x15 gtb w/abd   2x10 gtb 2x10 gtb 2x15 gtb 2x15 gtb 2x15 gtb   IR with retraction 2x15 gtb 2x15 gtb 2x15 gtb w/abd   2x10 gtb 2x10 gtb 2x15 gtb 2x15 gtb 2x15 gtb   CarMax ext with retraction 3x10 bltb 3x10 bltb   2x10 gtb 2x10 gtb 2x10 gtb 3x10 gtb 3x10 gtb 2x10 bltb   Row with retraction 3x10 bltb 3x10  bltb   2x10 bltb 2x10 bltb 2x10 bltb 3x10 bltb 3x10 bltb 3x10 bltb   Ther Ex             Table slide flexion   Wall flex 10x10" Wall 10x Wall slide 10x Wall slide 10x 1# Wall slide 10x 1# Wall 10x 1.5# Wall 10x 2# Wall 10x 2#   Table slide abd   Wall abd 10x10" Wall 10x         Wall circles   10 ea  10 ea 1# 10 ea 1# 10 ea 1# 10ea 1.5# 10 ea 2# 10 ea 2#                Pt edu on plan of care, pathology, home program    2'   2'                   IR PROM behind back 5x10" 5x10" 10x5" 5x10" 5x10" 5x10" 5x10" 5x10" 5x10" 5x10"   Bent over triceps ext 2x10 5# 2x10 5# 2x10 5# 2x10 6#      10x 5#   Biceps curls 2x10 5# 2x10 5# 2x10 5# 2x10 6#      10x 5#   Pulley flexion     10x5"        Pulley abd     10x5"                     UBE for ROM/strength 3'/3' lvl 4 3'/3'  Lvl 4 3'/3'  Lvl 5 3'/3' lvl 5 2.5' ea lvl 3 2'/2' lvl 4 3'/3' lvl 4 3'/3' lvl 4 3'/3' lvl 4 3'/3' lvl 4   Doorway ER stretch 5x10" 5x10" 5x10" 5x10" 5x10" 5x10"  5x10" 5x10" 5x10"                Resisted shld flex 10x 2# 10x  2#  10x  2#     10x rtb 10x 2#   Resisted shld abd 10x 2# 10x  2#  10x  2#      10x 2#   Resisted shld scap 10x 2# 10x  2#  10x  2#     10x rtb 10x 2#   Standing at wall for IR/ER reach  2x10 20x          Modified push up on table  NV  2x10         Ther Activity             Simulated baby  20-25# 20-25#                        Gait Training                                       Modalities             Cold pack end     8' 8' 8'

## 2023-12-11 ENCOUNTER — OFFICE VISIT (OUTPATIENT)
Dept: PHYSICAL THERAPY | Facility: CLINIC | Age: 58
End: 2023-12-11
Payer: COMMERCIAL

## 2023-12-11 DIAGNOSIS — M25.511 CHRONIC RIGHT SHOULDER PAIN: ICD-10-CM

## 2023-12-11 DIAGNOSIS — M62.81 MUSCLE WEAKNESS OF RIGHT UPPER EXTREMITY: ICD-10-CM

## 2023-12-11 DIAGNOSIS — S42.291D OTHER CLOSED DISPLACED FRACTURE OF PROXIMAL END OF RIGHT HUMERUS WITH ROUTINE HEALING, SUBSEQUENT ENCOUNTER: Primary | ICD-10-CM

## 2023-12-11 DIAGNOSIS — G89.29 CHRONIC RIGHT SHOULDER PAIN: ICD-10-CM

## 2023-12-11 PROCEDURE — 97110 THERAPEUTIC EXERCISES: CPT | Performed by: PHYSICAL THERAPIST

## 2023-12-11 PROCEDURE — 97140 MANUAL THERAPY 1/> REGIONS: CPT | Performed by: PHYSICAL THERAPIST

## 2023-12-11 NOTE — PROGRESS NOTES
Discharge Summary    Today's date: 2023  Patient name: Mauro Schuster  : 1965  MRN: [id number]  Referring provider: Twila Jain PA-C  Dx:   Encounter Diagnosis     ICD-10-CM    1. Other closed displaced fracture of proximal end of right humerus with routine healing, subsequent encounter  S42.291D       2. Chronic right shoulder pain  M25.511     G89.29       3. Muscle weakness of right upper extremity  M62.81                        Subjective: Patient reports that her arm continues to feel good. She reports some soreness following multiple lifting of grandchild but resolves with stretching and use of sleeve. She has been consistent with home program and feels comfortable with discharge today. Objective: See treatment diary below    Shld PROM  175 flexion  172 abd  90 ER  90 IR    Shld MMT  4+ flex  4+ abd  4+ ER  5 IR  5 elbow flex  5 elbow ext    Assessment: Further improvements seen in shoulder PROM, approaching near full range in all directions. Mild weakness seen still with shoulder ER, Flexion, and abduction, but can be managed with home program. Educated patient on updated home program and discharge recommendations; she verbalized understanding to all education. At this time, patient is appropriate for discharge to home program to maintain improvements and further progress upon impairments. Plan: Discharge to home program.     Precautions: AROM, PROM, gradual return to strengthening.  EPOC 12/15/23    Manuals 11/22 11/27 11/29 12/7 12/11 11/7 11/9 11/13 11/15 11/20   Shld PROM with jt stabilization NICOLE sheba RIVERA JP, JP  NICOLE NIOCLE NICOLE NICOLE   IASTM upper arm, medial and lateral NICOLE sheba RIVERA JP, JP NICOLE NICOLE NICOLE NICOLE    STM UT/infraspinatus NICOLE ksg NICOLE NICOLE      NICOLE TPR                Neuro Re-Ed             Horizontal abd    2x10 rtb         No  money    2x10 rtb 2x15 rtb        Median nerve flossing      15x       Radial nerve stretch   15x    10x 15x 15x 15x   Median nerve stretch   15x    10x 15x 15x 15x   ER with retraction 2x15 gtb 2x15 gtb 2x15 gtb w/abd   2x10 gtb 2x10 gtb 2x15 gtb 2x15 gtb 2x15 gtb   IR with retraction 2x15 gtb 2x15 gtb 2x15 gtb w/abd   2x10 gtb 2x10 gtb 2x15 gtb 2x15 gtb 2x15 gtb   CarMax ext with retraction 3x10 bltb 3x10 bltb    2x10 gtb 2x10 gtb 3x10 gtb 3x10 gtb 2x10 bltb   Row with retraction 3x10 bltb 3x10  bltb    2x10 bltb 2x10 bltb 3x10 bltb 3x10 bltb 3x10 bltb   Ther Ex             Table slide flexion   Wall flex 10x10" Wall 10x Wall slide 10x Wall slide 10x 1# Wall slide 10x 1# Wall 10x 1.5# Wall 10x 2# Wall 10x 2#   Table slide abd   Wall abd 10x10" Wall 10x Wall 10x        Wall circles   10 ea   10 ea 1# 10 ea 1# 10ea 1.5# 10 ea 2# 10 ea 2#                Pt edu on plan of care, pathology, home program    2' 5'  2'                   IR PROM behind back 5x10" 5x10" 10x5" 5x10" 5x10" 5x10" 5x10" 5x10" 5x10" 5x10"   Bent over triceps ext 2x10 5# 2x10 5# 2x10 5# 2x10 6#      10x 5#   Biceps curls 2x10 5# 2x10 5# 2x10 5# 2x10 6#      10x 5#   Pulley flexion             Pulley abd                          UBE for ROM/strength 3'/3' lvl 4 3'/3'  Lvl 4 3'/3'  Lvl 5 3'/3' lvl 5 3'/3' lvl 5 2'/2' lvl 4 3'/3' lvl 4 3'/3' lvl 4 3'/3' lvl 4 3'/3' lvl 4   Doorway ER stretch 5x10" 5x10" 5x10" 5x10" 5x10" 5x10"  5x10" 5x10" 5x10"                Resisted shld flex 10x 2# 10x  2#  10x  2#     10x rtb 10x 2#   Resisted shld abd 10x 2# 10x  2#  10x  2#      10x 2#   Resisted shld scap 10x 2# 10x  2#  10x  2#     10x rtb 10x 2#   Standing at wall for IR/ER reach  2x10 20x          Modified push up on table  NV  2x10         Ther Activity             Simulated baby  20-25# 20-25#                        Gait Training                                       Modalities             Cold pack end      8' 8'

## 2024-09-16 NOTE — TELEPHONE ENCOUNTER
Xray of right shoulder- Acute, comminuted, mildly displaced fracture of the right humeral head and neck.     No evidence of dislocation or AC joint separation.     Patient was called and has an appointment with Dr. Jeanne Anthony today at 3:00 PM
fair balance